# Patient Record
Sex: FEMALE | Race: WHITE | NOT HISPANIC OR LATINO | Employment: UNEMPLOYED | URBAN - METROPOLITAN AREA
[De-identification: names, ages, dates, MRNs, and addresses within clinical notes are randomized per-mention and may not be internally consistent; named-entity substitution may affect disease eponyms.]

---

## 2024-05-03 ENCOUNTER — APPOINTMENT (OUTPATIENT)
Dept: CARDIOLOGY | Facility: HOSPITAL | Age: 59
End: 2024-05-03
Payer: COMMERCIAL

## 2024-05-03 ENCOUNTER — APPOINTMENT (OUTPATIENT)
Dept: RADIOLOGY | Facility: HOSPITAL | Age: 59
End: 2024-05-03
Payer: COMMERCIAL

## 2024-05-03 ENCOUNTER — HOSPITAL ENCOUNTER (OUTPATIENT)
Facility: HOSPITAL | Age: 59
Setting detail: OBSERVATION
Discharge: HOME | End: 2024-05-05
Attending: EMERGENCY MEDICINE | Admitting: PHYSICIAN ASSISTANT
Payer: COMMERCIAL

## 2024-05-03 DIAGNOSIS — D72.829 LEUKOCYTOSIS, UNSPECIFIED TYPE: ICD-10-CM

## 2024-05-03 DIAGNOSIS — K52.9 ENTERITIS: ICD-10-CM

## 2024-05-03 DIAGNOSIS — R79.89 ELEVATED TROPONIN: ICD-10-CM

## 2024-05-03 DIAGNOSIS — N17.9 ACUTE KIDNEY INJURY (CMS-HCC): Primary | ICD-10-CM

## 2024-05-03 DIAGNOSIS — T50.901A ACCIDENTAL DRUG OVERDOSE, INITIAL ENCOUNTER: ICD-10-CM

## 2024-05-03 PROCEDURE — 99285 EMERGENCY DEPT VISIT HI MDM: CPT | Mod: 25

## 2024-05-03 PROCEDURE — 82550 ASSAY OF CK (CPK): CPT | Performed by: PHYSICIAN ASSISTANT

## 2024-05-03 PROCEDURE — 93005 ELECTROCARDIOGRAM TRACING: CPT

## 2024-05-03 PROCEDURE — 83735 ASSAY OF MAGNESIUM: CPT | Performed by: PHYSICIAN ASSISTANT

## 2024-05-03 PROCEDURE — 71045 X-RAY EXAM CHEST 1 VIEW: CPT | Performed by: SURGERY

## 2024-05-03 PROCEDURE — 84484 ASSAY OF TROPONIN QUANT: CPT | Performed by: PHYSICIAN ASSISTANT

## 2024-05-03 PROCEDURE — 36415 COLL VENOUS BLD VENIPUNCTURE: CPT | Performed by: PHYSICIAN ASSISTANT

## 2024-05-03 PROCEDURE — 85025 COMPLETE CBC W/AUTO DIFF WBC: CPT | Performed by: PHYSICIAN ASSISTANT

## 2024-05-03 PROCEDURE — 80053 COMPREHEN METABOLIC PANEL: CPT | Performed by: PHYSICIAN ASSISTANT

## 2024-05-03 PROCEDURE — 71045 X-RAY EXAM CHEST 1 VIEW: CPT

## 2024-05-03 ASSESSMENT — LIFESTYLE VARIABLES
EVER FELT BAD OR GUILTY ABOUT YOUR DRINKING: NO
HAVE PEOPLE ANNOYED YOU BY CRITICIZING YOUR DRINKING: NO
HAVE YOU EVER FELT YOU SHOULD CUT DOWN ON YOUR DRINKING: NO
EVER HAD A DRINK FIRST THING IN THE MORNING TO STEADY YOUR NERVES TO GET RID OF A HANGOVER: NO
TOTAL SCORE: 0

## 2024-05-03 ASSESSMENT — COLUMBIA-SUICIDE SEVERITY RATING SCALE - C-SSRS
6. HAVE YOU EVER DONE ANYTHING, STARTED TO DO ANYTHING, OR PREPARED TO DO ANYTHING TO END YOUR LIFE?: NO
2. HAVE YOU ACTUALLY HAD ANY THOUGHTS OF KILLING YOURSELF?: NO
1. IN THE PAST MONTH, HAVE YOU WISHED YOU WERE DEAD OR WISHED YOU COULD GO TO SLEEP AND NOT WAKE UP?: NO

## 2024-05-03 ASSESSMENT — PAIN - FUNCTIONAL ASSESSMENT: PAIN_FUNCTIONAL_ASSESSMENT: 0-10

## 2024-05-03 ASSESSMENT — PAIN SCALES - GENERAL: PAINLEVEL_OUTOF10: 0 - NO PAIN

## 2024-05-04 ENCOUNTER — APPOINTMENT (OUTPATIENT)
Dept: RADIOLOGY | Facility: HOSPITAL | Age: 59
End: 2024-05-04
Payer: COMMERCIAL

## 2024-05-04 PROBLEM — N17.9 AKI (ACUTE KIDNEY INJURY) (CMS-HCC): Status: ACTIVE | Noted: 2024-05-04

## 2024-05-04 LAB
ALBUMIN SERPL BCP-MCNC: 4.7 G/DL (ref 3.4–5)
ALP SERPL-CCNC: 67 U/L (ref 33–136)
ALT SERPL W P-5'-P-CCNC: 38 U/L (ref 7–45)
AMPHETAMINES UR QL SCN: ABNORMAL
ANION GAP SERPL CALC-SCNC: 13 MMOL/L (ref 10–20)
ANION GAP SERPL CALC-SCNC: 17 MMOL/L (ref 10–20)
APPEARANCE UR: ABNORMAL
AST SERPL W P-5'-P-CCNC: 59 U/L (ref 9–39)
BARBITURATES UR QL SCN: ABNORMAL
BASOPHILS # BLD AUTO: 0.06 X10*3/UL (ref 0–0.1)
BASOPHILS NFR BLD AUTO: 0.3 %
BENZODIAZ UR QL SCN: ABNORMAL
BILIRUB SERPL-MCNC: 0.5 MG/DL (ref 0–1.2)
BILIRUB UR STRIP.AUTO-MCNC: NEGATIVE MG/DL
BUN SERPL-MCNC: 14 MG/DL (ref 6–23)
BUN SERPL-MCNC: 14 MG/DL (ref 6–23)
BZE UR QL SCN: ABNORMAL
CALCIUM SERPL-MCNC: 8 MG/DL (ref 8.6–10.3)
CALCIUM SERPL-MCNC: 9.1 MG/DL (ref 8.6–10.3)
CANNABINOIDS UR QL SCN: ABNORMAL
CARDIAC TROPONIN I PNL SERPL HS: 56 NG/L (ref 0–13)
CARDIAC TROPONIN I PNL SERPL HS: 66 NG/L (ref 0–13)
CHLORIDE SERPL-SCNC: 105 MMOL/L (ref 98–107)
CHLORIDE SERPL-SCNC: 107 MMOL/L (ref 98–107)
CK SERPL-CCNC: 1281 U/L (ref 0–215)
CK SERPL-CCNC: 614 U/L (ref 0–215)
CO2 SERPL-SCNC: 20 MMOL/L (ref 21–32)
CO2 SERPL-SCNC: 21 MMOL/L (ref 21–32)
COLOR UR: YELLOW
CREAT SERPL-MCNC: 1.15 MG/DL (ref 0.5–1.05)
CREAT SERPL-MCNC: 1.67 MG/DL (ref 0.5–1.05)
EGFRCR SERPLBLD CKD-EPI 2021: 35 ML/MIN/1.73M*2
EGFRCR SERPLBLD CKD-EPI 2021: 55 ML/MIN/1.73M*2
EOSINOPHIL # BLD AUTO: 0.02 X10*3/UL (ref 0–0.7)
EOSINOPHIL NFR BLD AUTO: 0.1 %
ERYTHROCYTE [DISTWIDTH] IN BLOOD BY AUTOMATED COUNT: 15.1 % (ref 11.5–14.5)
ERYTHROCYTE [DISTWIDTH] IN BLOOD BY AUTOMATED COUNT: 15.3 % (ref 11.5–14.5)
FENTANYL+NORFENTANYL UR QL SCN: ABNORMAL
GLUCOSE BLD MANUAL STRIP-MCNC: 116 MG/DL (ref 74–99)
GLUCOSE BLD MANUAL STRIP-MCNC: 128 MG/DL (ref 74–99)
GLUCOSE BLD MANUAL STRIP-MCNC: 156 MG/DL (ref 74–99)
GLUCOSE BLD MANUAL STRIP-MCNC: 91 MG/DL (ref 74–99)
GLUCOSE SERPL-MCNC: 56 MG/DL (ref 74–99)
GLUCOSE SERPL-MCNC: 85 MG/DL (ref 74–99)
GLUCOSE UR STRIP.AUTO-MCNC: ABNORMAL MG/DL
HCT VFR BLD AUTO: 35.2 % (ref 36–46)
HCT VFR BLD AUTO: 44.2 % (ref 36–46)
HGB BLD-MCNC: 12.1 G/DL (ref 12–16)
HGB BLD-MCNC: 15.3 G/DL (ref 12–16)
HOLD SPECIMEN: NORMAL
HYALINE CASTS #/AREA URNS AUTO: ABNORMAL /LPF
IMM GRANULOCYTES # BLD AUTO: 0.26 X10*3/UL (ref 0–0.7)
IMM GRANULOCYTES NFR BLD AUTO: 1.1 % (ref 0–0.9)
KETONES UR STRIP.AUTO-MCNC: NEGATIVE MG/DL
LEUKOCYTE ESTERASE UR QL STRIP.AUTO: NEGATIVE
LYMPHOCYTES # BLD AUTO: 0.86 X10*3/UL (ref 1.2–4.8)
LYMPHOCYTES NFR BLD AUTO: 3.7 %
MAGNESIUM SERPL-MCNC: 2.19 MG/DL (ref 1.6–2.4)
MCH RBC QN AUTO: 31.7 PG (ref 26–34)
MCH RBC QN AUTO: 32 PG (ref 26–34)
MCHC RBC AUTO-ENTMCNC: 34.4 G/DL (ref 32–36)
MCHC RBC AUTO-ENTMCNC: 34.6 G/DL (ref 32–36)
MCV RBC AUTO: 92 FL (ref 80–100)
MCV RBC AUTO: 93 FL (ref 80–100)
METHADONE UR QL SCN: ABNORMAL
MONOCYTES # BLD AUTO: 1.76 X10*3/UL (ref 0.1–1)
MONOCYTES NFR BLD AUTO: 7.6 %
MUCOUS THREADS #/AREA URNS AUTO: ABNORMAL /LPF
NEUTROPHILS # BLD AUTO: 20.34 X10*3/UL (ref 1.2–7.7)
NEUTROPHILS NFR BLD AUTO: 87.2 %
NITRITE UR QL STRIP.AUTO: NEGATIVE
NRBC BLD-RTO: 0 /100 WBCS (ref 0–0)
NRBC BLD-RTO: 0 /100 WBCS (ref 0–0)
OPIATES UR QL SCN: ABNORMAL
OXYCODONE+OXYMORPHONE UR QL SCN: ABNORMAL
PCP UR QL SCN: ABNORMAL
PH UR STRIP.AUTO: 5 [PH]
PLATELET # BLD AUTO: 183 X10*3/UL (ref 150–450)
PLATELET # BLD AUTO: 283 X10*3/UL (ref 150–450)
POTASSIUM SERPL-SCNC: 3.7 MMOL/L (ref 3.5–5.3)
POTASSIUM SERPL-SCNC: 3.9 MMOL/L (ref 3.5–5.3)
PROT SERPL-MCNC: 7.4 G/DL (ref 6.4–8.2)
PROT UR STRIP.AUTO-MCNC: ABNORMAL MG/DL
RBC # BLD AUTO: 3.78 X10*6/UL (ref 4–5.2)
RBC # BLD AUTO: 4.82 X10*6/UL (ref 4–5.2)
RBC # UR STRIP.AUTO: ABNORMAL /UL
RBC #/AREA URNS AUTO: ABNORMAL /HPF
SODIUM SERPL-SCNC: 134 MMOL/L (ref 136–145)
SODIUM SERPL-SCNC: 141 MMOL/L (ref 136–145)
SP GR UR STRIP.AUTO: 1.05
SQUAMOUS #/AREA URNS AUTO: ABNORMAL /HPF
UROBILINOGEN UR STRIP.AUTO-MCNC: <2 MG/DL
WBC # BLD AUTO: 13.4 X10*3/UL (ref 4.4–11.3)
WBC # BLD AUTO: 23.3 X10*3/UL (ref 4.4–11.3)
WBC #/AREA URNS AUTO: ABNORMAL /HPF

## 2024-05-04 PROCEDURE — G0378 HOSPITAL OBSERVATION PER HR: HCPCS

## 2024-05-04 PROCEDURE — 85027 COMPLETE CBC AUTOMATED: CPT | Performed by: PHYSICIAN ASSISTANT

## 2024-05-04 PROCEDURE — 96372 THER/PROPH/DIAG INJ SC/IM: CPT | Performed by: PHYSICIAN ASSISTANT

## 2024-05-04 PROCEDURE — 2500000001 HC RX 250 WO HCPCS SELF ADMINISTERED DRUGS (ALT 637 FOR MEDICARE OP): Performed by: INTERNAL MEDICINE

## 2024-05-04 PROCEDURE — 96376 TX/PRO/DX INJ SAME DRUG ADON: CPT

## 2024-05-04 PROCEDURE — 74177 CT ABD & PELVIS W/CONTRAST: CPT

## 2024-05-04 PROCEDURE — 2500000004 HC RX 250 GENERAL PHARMACY W/ HCPCS (ALT 636 FOR OP/ED): Performed by: PHYSICIAN ASSISTANT

## 2024-05-04 PROCEDURE — 2500000005 HC RX 250 GENERAL PHARMACY W/O HCPCS: Performed by: EMERGENCY MEDICINE

## 2024-05-04 PROCEDURE — 80307 DRUG TEST PRSMV CHEM ANLYZR: CPT | Performed by: PHYSICIAN ASSISTANT

## 2024-05-04 PROCEDURE — 2550000001 HC RX 255 CONTRASTS: Performed by: EMERGENCY MEDICINE

## 2024-05-04 PROCEDURE — 74177 CT ABD & PELVIS W/CONTRAST: CPT | Performed by: RADIOLOGY

## 2024-05-04 PROCEDURE — 82550 ASSAY OF CK (CPK): CPT | Performed by: PHYSICIAN ASSISTANT

## 2024-05-04 PROCEDURE — 81001 URINALYSIS AUTO W/SCOPE: CPT | Mod: 59 | Performed by: PHYSICIAN ASSISTANT

## 2024-05-04 PROCEDURE — S4991 NICOTINE PATCH NONLEGEND: HCPCS | Performed by: PHYSICIAN ASSISTANT

## 2024-05-04 PROCEDURE — 97165 OT EVAL LOW COMPLEX 30 MIN: CPT | Mod: GO

## 2024-05-04 PROCEDURE — 84484 ASSAY OF TROPONIN QUANT: CPT | Performed by: EMERGENCY MEDICINE

## 2024-05-04 PROCEDURE — 2500000001 HC RX 250 WO HCPCS SELF ADMINISTERED DRUGS (ALT 637 FOR MEDICARE OP): Performed by: NURSE PRACTITIONER

## 2024-05-04 PROCEDURE — 76770 US EXAM ABDO BACK WALL COMP: CPT

## 2024-05-04 PROCEDURE — 36415 COLL VENOUS BLD VENIPUNCTURE: CPT | Performed by: PHYSICIAN ASSISTANT

## 2024-05-04 PROCEDURE — 82947 ASSAY GLUCOSE BLOOD QUANT: CPT | Mod: 59

## 2024-05-04 PROCEDURE — 96361 HYDRATE IV INFUSION ADD-ON: CPT

## 2024-05-04 PROCEDURE — 36415 COLL VENOUS BLD VENIPUNCTURE: CPT | Performed by: EMERGENCY MEDICINE

## 2024-05-04 PROCEDURE — 96365 THER/PROPH/DIAG IV INF INIT: CPT | Mod: 59

## 2024-05-04 PROCEDURE — 96375 TX/PRO/DX INJ NEW DRUG ADDON: CPT

## 2024-05-04 PROCEDURE — 83874 ASSAY OF MYOGLOBIN: CPT | Mod: PARLAB | Performed by: INTERNAL MEDICINE

## 2024-05-04 PROCEDURE — 80048 BASIC METABOLIC PNL TOTAL CA: CPT | Performed by: PHYSICIAN ASSISTANT

## 2024-05-04 PROCEDURE — 76770 US EXAM ABDO BACK WALL COMP: CPT | Performed by: RADIOLOGY

## 2024-05-04 PROCEDURE — 87086 URINE CULTURE/COLONY COUNT: CPT | Mod: PARLAB | Performed by: PHYSICIAN ASSISTANT

## 2024-05-04 PROCEDURE — 2500000002 HC RX 250 W HCPCS SELF ADMINISTERED DRUGS (ALT 637 FOR MEDICARE OP, ALT 636 FOR OP/ED): Performed by: PHYSICIAN ASSISTANT

## 2024-05-04 PROCEDURE — 97161 PT EVAL LOW COMPLEX 20 MIN: CPT | Mod: GP

## 2024-05-04 PROCEDURE — 2500000004 HC RX 250 GENERAL PHARMACY W/ HCPCS (ALT 636 FOR OP/ED): Performed by: EMERGENCY MEDICINE

## 2024-05-04 RX ORDER — HEPARIN SODIUM 5000 [USP'U]/ML
5000 INJECTION, SOLUTION INTRAVENOUS; SUBCUTANEOUS EVERY 8 HOURS SCHEDULED
Status: DISCONTINUED | OUTPATIENT
Start: 2024-05-04 | End: 2024-05-05 | Stop reason: HOSPADM

## 2024-05-04 RX ORDER — ACETAMINOPHEN 325 MG/1
650 TABLET ORAL EVERY 4 HOURS PRN
Status: DISCONTINUED | OUTPATIENT
Start: 2024-05-04 | End: 2024-05-05 | Stop reason: HOSPADM

## 2024-05-04 RX ORDER — BUPRENORPHINE HYDROCHLORIDE AND NALOXONE HYDROCHLORIDE DIHYDRATE 8; 2 MG/1; MG/1
1 TABLET SUBLINGUAL 2 TIMES DAILY
COMMUNITY

## 2024-05-04 RX ORDER — DEXTROSE 50 % IN WATER (D50W) INTRAVENOUS SYRINGE
25
Status: DISCONTINUED | OUTPATIENT
Start: 2024-05-04 | End: 2024-05-05 | Stop reason: HOSPADM

## 2024-05-04 RX ORDER — ONDANSETRON HYDROCHLORIDE 2 MG/ML
4 INJECTION, SOLUTION INTRAVENOUS ONCE
Status: COMPLETED | OUTPATIENT
Start: 2024-05-04 | End: 2024-05-04

## 2024-05-04 RX ORDER — DEXTROSE 50 % IN WATER (D50W) INTRAVENOUS SYRINGE
25 ONCE
Status: COMPLETED | OUTPATIENT
Start: 2024-05-04 | End: 2024-05-04

## 2024-05-04 RX ORDER — OMEPRAZOLE 40 MG/1
40 CAPSULE, DELAYED RELEASE ORAL
COMMUNITY

## 2024-05-04 RX ORDER — DOXEPIN HYDROCHLORIDE 25 MG/1
25 CAPSULE ORAL NIGHTLY PRN
COMMUNITY

## 2024-05-04 RX ORDER — DICYCLOMINE HYDROCHLORIDE 10 MG/1
10 CAPSULE ORAL 3 TIMES DAILY
Status: DISCONTINUED | OUTPATIENT
Start: 2024-05-04 | End: 2024-05-05 | Stop reason: HOSPADM

## 2024-05-04 RX ORDER — DEXTROSE 50 % IN WATER (D50W) INTRAVENOUS SYRINGE
12.5
Status: DISCONTINUED | OUTPATIENT
Start: 2024-05-04 | End: 2024-05-05 | Stop reason: HOSPADM

## 2024-05-04 RX ORDER — SODIUM CHLORIDE 9 MG/ML
100 INJECTION, SOLUTION INTRAVENOUS CONTINUOUS
Status: DISCONTINUED | OUTPATIENT
Start: 2024-05-04 | End: 2024-05-05

## 2024-05-04 RX ORDER — IBUPROFEN 200 MG
1 TABLET ORAL DAILY
Status: DISCONTINUED | OUTPATIENT
Start: 2024-05-04 | End: 2024-05-05 | Stop reason: HOSPADM

## 2024-05-04 RX ORDER — ONDANSETRON HYDROCHLORIDE 2 MG/ML
4 INJECTION, SOLUTION INTRAVENOUS EVERY 6 HOURS PRN
Status: DISCONTINUED | OUTPATIENT
Start: 2024-05-04 | End: 2024-05-05 | Stop reason: HOSPADM

## 2024-05-04 RX ORDER — BUPROPION HYDROCHLORIDE 300 MG/1
300 TABLET ORAL DAILY
COMMUNITY

## 2024-05-04 RX ORDER — AMLODIPINE BESYLATE 2.5 MG/1
2.5 TABLET ORAL DAILY
COMMUNITY

## 2024-05-04 RX ORDER — BUPROPION HYDROCHLORIDE 150 MG/1
300 TABLET ORAL DAILY
Status: DISCONTINUED | OUTPATIENT
Start: 2024-05-04 | End: 2024-05-05 | Stop reason: HOSPADM

## 2024-05-04 RX ORDER — ONDANSETRON 4 MG/1
4 TABLET, FILM COATED ORAL EVERY 6 HOURS PRN
Status: DISCONTINUED | OUTPATIENT
Start: 2024-05-04 | End: 2024-05-05 | Stop reason: HOSPADM

## 2024-05-04 RX ORDER — BUPRENORPHINE HYDROCHLORIDE 8 MG/1
8 TABLET SUBLINGUAL 2 TIMES DAILY
Status: DISCONTINUED | OUTPATIENT
Start: 2024-05-04 | End: 2024-05-05 | Stop reason: HOSPADM

## 2024-05-04 RX ORDER — HYDROXYZINE HYDROCHLORIDE 25 MG/1
25 TABLET, FILM COATED ORAL EVERY 4 HOURS PRN
COMMUNITY

## 2024-05-04 RX ORDER — KETOROLAC TROMETHAMINE 30 MG/ML
7.5 INJECTION, SOLUTION INTRAMUSCULAR; INTRAVENOUS ONCE
Status: COMPLETED | OUTPATIENT
Start: 2024-05-04 | End: 2024-05-04

## 2024-05-04 RX ORDER — PANTOPRAZOLE SODIUM 20 MG/1
20 TABLET, DELAYED RELEASE ORAL
Status: DISCONTINUED | OUTPATIENT
Start: 2024-05-05 | End: 2024-05-05 | Stop reason: HOSPADM

## 2024-05-04 RX ORDER — BUPRENORPHINE HYDROCHLORIDE AND NALOXONE HYDROCHLORIDE DIHYDRATE 8; 2 MG/1; MG/1
1 TABLET SUBLINGUAL 2 TIMES DAILY
Status: DISCONTINUED | OUTPATIENT
Start: 2024-05-04 | End: 2024-05-04 | Stop reason: RX

## 2024-05-04 RX ORDER — ACETAMINOPHEN 160 MG/5ML
650 SOLUTION ORAL EVERY 4 HOURS PRN
Status: DISCONTINUED | OUTPATIENT
Start: 2024-05-04 | End: 2024-05-05 | Stop reason: HOSPADM

## 2024-05-04 RX ORDER — CELECOXIB 200 MG/1
200 CAPSULE ORAL 2 TIMES DAILY
COMMUNITY

## 2024-05-04 RX ADMIN — DICYCLOMINE HYDROCHLORIDE 10 MG: 10 CAPSULE ORAL at 17:02

## 2024-05-04 RX ADMIN — PIPERACILLIN SODIUM AND TAZOBACTAM SODIUM 3.38 G: 3; .375 INJECTION, SOLUTION INTRAVENOUS at 20:38

## 2024-05-04 RX ADMIN — SODIUM CHLORIDE, POTASSIUM CHLORIDE, SODIUM LACTATE AND CALCIUM CHLORIDE 1000 ML: 600; 310; 30; 20 INJECTION, SOLUTION INTRAVENOUS at 00:45

## 2024-05-04 RX ADMIN — SODIUM CHLORIDE 100 ML/HR: 9 INJECTION, SOLUTION INTRAVENOUS at 14:12

## 2024-05-04 RX ADMIN — PIPERACILLIN SODIUM AND TAZOBACTAM SODIUM 3.38 G: 3; .375 INJECTION, SOLUTION INTRAVENOUS at 14:49

## 2024-05-04 RX ADMIN — BUPROPION HYDROCHLORIDE 300 MG: 150 TABLET, EXTENDED RELEASE ORAL at 12:29

## 2024-05-04 RX ADMIN — ACETAMINOPHEN 650 MG: 325 TABLET ORAL at 19:45

## 2024-05-04 RX ADMIN — SODIUM CHLORIDE 100 ML/HR: 9 INJECTION, SOLUTION INTRAVENOUS at 23:54

## 2024-05-04 RX ADMIN — HEPARIN SODIUM 5000 UNITS: 5000 INJECTION INTRAVENOUS; SUBCUTANEOUS at 21:02

## 2024-05-04 RX ADMIN — KETOROLAC TROMETHAMINE 7.5 MG: 30 INJECTION, SOLUTION INTRAMUSCULAR at 04:04

## 2024-05-04 RX ADMIN — SODIUM CHLORIDE 1000 ML: 9 INJECTION, SOLUTION INTRAVENOUS at 01:29

## 2024-05-04 RX ADMIN — NICOTINE 1 PATCH: 14 PATCH, EXTENDED RELEASE TRANSDERMAL at 09:41

## 2024-05-04 RX ADMIN — DEXTROSE MONOHYDRATE 25 G: 25 INJECTION, SOLUTION INTRAVENOUS at 00:44

## 2024-05-04 RX ADMIN — IOHEXOL 75 ML: 350 INJECTION, SOLUTION INTRAVENOUS at 02:00

## 2024-05-04 RX ADMIN — HEPARIN SODIUM 5000 UNITS: 5000 INJECTION INTRAVENOUS; SUBCUTANEOUS at 14:12

## 2024-05-04 RX ADMIN — SODIUM CHLORIDE 100 ML/HR: 9 INJECTION, SOLUTION INTRAVENOUS at 04:04

## 2024-05-04 RX ADMIN — PIPERACILLIN SODIUM AND TAZOBACTAM SODIUM 3.38 G: 3; .375 INJECTION, SOLUTION INTRAVENOUS at 04:04

## 2024-05-04 RX ADMIN — DICYCLOMINE HYDROCHLORIDE 10 MG: 10 CAPSULE ORAL at 20:38

## 2024-05-04 RX ADMIN — PIPERACILLIN SODIUM AND TAZOBACTAM SODIUM 3.38 G: 3; .375 INJECTION, SOLUTION INTRAVENOUS at 09:42

## 2024-05-04 RX ADMIN — HEPARIN SODIUM 5000 UNITS: 5000 INJECTION INTRAVENOUS; SUBCUTANEOUS at 06:12

## 2024-05-04 RX ADMIN — ONDANSETRON 4 MG: 2 INJECTION INTRAMUSCULAR; INTRAVENOUS at 00:44

## 2024-05-04 RX ADMIN — ACETAMINOPHEN 650 MG: 325 TABLET ORAL at 11:47

## 2024-05-04 SDOH — HEALTH STABILITY: PHYSICAL HEALTH: ON AVERAGE, HOW MANY DAYS PER WEEK DO YOU ENGAGE IN MODERATE TO STRENUOUS EXERCISE (LIKE A BRISK WALK)?: 0 DAYS

## 2024-05-04 SDOH — ECONOMIC STABILITY: TRANSPORTATION INSECURITY
IN THE PAST 12 MONTHS, HAS LACK OF TRANSPORTATION KEPT YOU FROM MEETINGS, WORK, OR FROM GETTING THINGS NEEDED FOR DAILY LIVING?: PATIENT DECLINED

## 2024-05-04 SDOH — SOCIAL STABILITY: SOCIAL INSECURITY: DO YOU FEEL ANYONE HAS EXPLOITED OR TAKEN ADVANTAGE OF YOU FINANCIALLY OR OF YOUR PERSONAL PROPERTY?: NO

## 2024-05-04 SDOH — SOCIAL STABILITY: SOCIAL INSECURITY: DO YOU FEEL UNSAFE GOING BACK TO THE PLACE WHERE YOU ARE LIVING?: NO

## 2024-05-04 SDOH — SOCIAL STABILITY: SOCIAL INSECURITY: WERE YOU ABLE TO COMPLETE ALL THE BEHAVIORAL HEALTH SCREENINGS?: YES

## 2024-05-04 SDOH — ECONOMIC STABILITY: INCOME INSECURITY: IN THE LAST 12 MONTHS, WAS THERE A TIME WHEN YOU WERE NOT ABLE TO PAY THE MORTGAGE OR RENT ON TIME?: PATIENT DECLINED

## 2024-05-04 SDOH — ECONOMIC STABILITY: HOUSING INSECURITY
IN THE LAST 12 MONTHS, WAS THERE A TIME WHEN YOU DID NOT HAVE A STEADY PLACE TO SLEEP OR SLEPT IN A SHELTER (INCLUDING NOW)?: PATIENT DECLINED

## 2024-05-04 SDOH — SOCIAL STABILITY: SOCIAL INSECURITY: ARE YOU OR HAVE YOU BEEN THREATENED OR ABUSED PHYSICALLY, EMOTIONALLY, OR SEXUALLY BY ANYONE?: NO

## 2024-05-04 SDOH — ECONOMIC STABILITY: INCOME INSECURITY: HOW HARD IS IT FOR YOU TO PAY FOR THE VERY BASICS LIKE FOOD, HOUSING, MEDICAL CARE, AND HEATING?: PATIENT DECLINED

## 2024-05-04 SDOH — SOCIAL STABILITY: SOCIAL INSECURITY: ABUSE: ADULT

## 2024-05-04 SDOH — SOCIAL STABILITY: SOCIAL INSECURITY: DOES ANYONE TRY TO KEEP YOU FROM HAVING/CONTACTING OTHER FRIENDS OR DOING THINGS OUTSIDE YOUR HOME?: NO

## 2024-05-04 SDOH — ECONOMIC STABILITY: TRANSPORTATION INSECURITY
IN THE PAST 12 MONTHS, HAS THE LACK OF TRANSPORTATION KEPT YOU FROM MEDICAL APPOINTMENTS OR FROM GETTING MEDICATIONS?: PATIENT DECLINED

## 2024-05-04 SDOH — SOCIAL STABILITY: SOCIAL INSECURITY: HAVE YOU HAD ANY THOUGHTS OF HARMING ANYONE ELSE?: NO

## 2024-05-04 SDOH — SOCIAL STABILITY: SOCIAL INSECURITY: HAVE YOU HAD THOUGHTS OF HARMING ANYONE ELSE?: NO

## 2024-05-04 SDOH — HEALTH STABILITY: PHYSICAL HEALTH: ON AVERAGE, HOW MANY MINUTES DO YOU ENGAGE IN EXERCISE AT THIS LEVEL?: 0 MIN

## 2024-05-04 SDOH — SOCIAL STABILITY: SOCIAL INSECURITY: ARE THERE ANY APPARENT SIGNS OF INJURIES/BEHAVIORS THAT COULD BE RELATED TO ABUSE/NEGLECT?: NO

## 2024-05-04 SDOH — SOCIAL STABILITY: SOCIAL INSECURITY: HAS ANYONE EVER THREATENED TO HURT YOUR FAMILY OR YOUR PETS?: NO

## 2024-05-04 ASSESSMENT — ACTIVITIES OF DAILY LIVING (ADL)
BATHING: INDEPENDENT
ADEQUATE_TO_COMPLETE_ADL: YES
GROOMING: INDEPENDENT
HEARING - LEFT EAR: FUNCTIONAL
WALKS IN HOME: INDEPENDENT
TOILETING: INDEPENDENT
JUDGMENT_ADEQUATE_SAFELY_COMPLETE_DAILY_ACTIVITIES: YES
HEARING - RIGHT EAR: FUNCTIONAL
LACK_OF_TRANSPORTATION: NO
PATIENT'S MEMORY ADEQUATE TO SAFELY COMPLETE DAILY ACTIVITIES?: YES
FEEDING YOURSELF: INDEPENDENT
DRESSING YOURSELF: INDEPENDENT

## 2024-05-04 ASSESSMENT — LIFESTYLE VARIABLES
HOW OFTEN DO YOU HAVE 6 OR MORE DRINKS ON ONE OCCASION: LESS THAN MONTHLY
HOW OFTEN DO YOU HAVE A DRINK CONTAINING ALCOHOL: MONTHLY OR LESS
AUDIT-C TOTAL SCORE: 2
HOW MANY STANDARD DRINKS CONTAINING ALCOHOL DO YOU HAVE ON A TYPICAL DAY: 1 OR 2
AUDIT-C TOTAL SCORE: 2
SKIP TO QUESTIONS 9-10: 0

## 2024-05-04 ASSESSMENT — COGNITIVE AND FUNCTIONAL STATUS - GENERAL
WALKING IN HOSPITAL ROOM: A LITTLE
DRESSING REGULAR LOWER BODY CLOTHING: A LITTLE
MOBILITY SCORE: 21
MOVING TO AND FROM BED TO CHAIR: A LITTLE
TOILETING: A LITTLE
CLIMB 3 TO 5 STEPS WITH RAILING: A LITTLE
DAILY ACTIVITIY SCORE: 24
DAILY ACTIVITIY SCORE: 20
MOBILITY SCORE: 24
DRESSING REGULAR UPPER BODY CLOTHING: A LITTLE
PATIENT BASELINE BEDBOUND: NO
HELP NEEDED FOR BATHING: A LITTLE

## 2024-05-04 ASSESSMENT — PAIN - FUNCTIONAL ASSESSMENT
PAIN_FUNCTIONAL_ASSESSMENT: 0-10
PAIN_FUNCTIONAL_ASSESSMENT: 0-10

## 2024-05-04 ASSESSMENT — PATIENT HEALTH QUESTIONNAIRE - PHQ9
1. LITTLE INTEREST OR PLEASURE IN DOING THINGS: SEVERAL DAYS
2. FEELING DOWN, DEPRESSED OR HOPELESS: SEVERAL DAYS
SUM OF ALL RESPONSES TO PHQ9 QUESTIONS 1 & 2: 2

## 2024-05-04 ASSESSMENT — PAIN DESCRIPTION - LOCATION: LOCATION: HEAD

## 2024-05-04 ASSESSMENT — PAIN SCALES - GENERAL
PAINLEVEL_OUTOF10: 3
PAINLEVEL_OUTOF10: 3

## 2024-05-04 NOTE — ED TRIAGE NOTES
"Patient arrives via EMS from home after possible overdose. Per EMS, upon arrival to scene patient has agonal respirations with pinpoint pupils and unresponsive. Patient was given 1 mg Narcan IV and was AxOx4 upon arrival to ED. Patient denies any drug use just says she took Narcan because she \"didn't feel good\". Patient is only complaining of mild nausea.   "

## 2024-05-04 NOTE — PROGRESS NOTES
Physical Therapy    Physical Therapy Evaluation    Patient Name: Trina Santos  MRN: [id number]  Today's Date: 5/4/2024   Time Calculation  Start Time: 1106  Stop Time: 1123  Time Calculation (min): 17 min    Assessment/Plan   PT Assessment  PT Assessment Results: Decreased strength, Decreased endurance, Impaired balance, Decreased mobility, Pain  Rehab Prognosis: Good  Evaluation/Treatment Tolerance: Patient tolerated treatment well  Medical Staff Made Aware: Yes  End of Session Communication: Bedside nurse  Assessment Comment: Pt presents /c above impairments and decline from functional baseline. Pt will benefit from continued PT services at low intensity to address above and maximize functional mobility.  End of Session Patient Position: Bed, 2 rail up, Alarm off, not on at start of session  IP OR SWING BED PT PLAN  Inpatient or Swing Bed: Inpatient  PT Plan  Treatment/Interventions: Bed mobility, Transfer training, Gait training, Balance training, Neuromuscular re-education, Strengthening, Endurance training, Therapeutic exercise, Therapeutic activity, Stair training  PT Plan: Skilled PT  PT Frequency: 3 times per week  PT Discharge Recommendations: Low intensity level of continued care  PT - OK to Discharge: Yes    Subjective     Current Problem:  Patient Active Problem List   Diagnosis    SHAKIRA (acute kidney injury) ([facility])       General Visit Information:  General  Reason for Referral: PT eval and treat  Referred By: Patsy  Past Medical History Relevant to Rehab: Bipolar, TIFFANY, opiate use disorder  Prior to Session Communication: Bedside nurse  Patient Position Received: Bed, 2 rail up, Alarm off, not on at start of session  General Comment: Room number:605   BIB EMS possible drug overdose. Agonal respirations upon EMS arrival, given Narcan. Also admits to nausea, diarrhea the past week and R sided abdominal pain. Isolation in place for cdiff r/o. Pt supine, agreeable.    Home Living:  Home Living  Home Living  Comments: Ranch style home, 3STE. Pt lives /c her mother who has dementia.    Prior Level of Function:  Prior Function Per Pt/Caregiver Report  Prior Function Comments: Pt indep at baseline. +drives. Denies falls or AD use. Has siblings for support.    Precautions:  Precautions  Precautions Comment: contact plus r/o Cdiff    Objective     Pain:  Pain Assessment  Pain Assessment: 0-10  Pain Score:  (pt c/o HA, R side discomfort and chronic R hip pain; does not rate)    Cognition:  Cognition  Overall Cognitive Status: Within Functional Limits (min safety cues)    General Assessments:  General Observation  General Observation: activity orders: ambulate /c A   skin integrity:WFL   Activity Tolerance  Endurance: Decreased tolerance for upright activites  Sensation  Sensation Comment: denies n/t  Strength  Strength Comments: LLE 4/5, RLE 4-/5, pain limited     Coordination  Movements are Fluid and Coordinated: Yes     Dynamic Sitting Balance  Dynamic Sitting-Comments: G  Dynamic Standing Balance  Dynamic Standing-Comments: G-    Functional Assessments:     Bed Mobility  Bed Mobility: Yes  Bed Mobility 1  Bed Mobility Comments 1: Supine<>Sit /c Supervision  Transfers  Transfer: Yes  Transfer 1  Trials/Comments 1: Sit<>Stand /c supervision  Ambulation/Gait Training  Ambulation/Gait Training Performed:  (Pt ambulates 20'x2 /c CGA. Step through gait, slightly antalgic pattern, no overt instability. Distance limited 2nd dizziness.)          Outcome Measures:  [facility] Basic Mobility  Turning from your back to your side while in a flat bed without using bedrails: None  Moving from lying on your back to sitting on the side of a flat bed without using bedrails: None  Moving to and from bed to chair (including a wheelchair): A little  Standing up from a chair using your arms (e.g. wheelchair or bedside chair): None  To walk in hospital room: A little  Climbing 3-5 steps with railing: A little  Basic Mobility - Total Score: 21        Goals:  Encounter Problems       Encounter Problems (Active)       PT Problem       STG - Pt will amb 200' using LRAD with mod I (Progressing)       Start:  05/04/24    Expected End:  05/18/24            STG -  Pt will navigate 4 stairs using rail with mod I (Progressing)       Start:  05/04/24    Expected End:  05/18/24            STG - Pt will maintain G dynamic standing balance to decrease risk of falls (Progressing)       Start:  05/04/24    Expected End:  05/18/24            STG - Pt will perform 2-3 sets of BLE therex x10 to maximize functional strength and independence  (Progressing)       Start:  05/04/24    Expected End:  05/18/24                 Education Documentation  Mobility Training, taught by Trina Yanez, PT at 5/4/2024  2:34 PM.  Learner: Patient  Readiness: Acceptance  Method: Explanation  Response: Verbalizes Understanding, Needs Reinforcement    Education Comments  No comments found.      '

## 2024-05-04 NOTE — H&P
History Of Present Illness  Trina Santos is a 58 y.o. female with past medical history significant for opiate use disorder, nicotine use disorder, bipolar disorder, and TIFFANY who presents via EMS for suspected drug overdose.  Police/EMS were on scene.  Police reported there was a white powder substance near the patient.  EMS reported patient suffered an accidental drug overdose where she thereafter took Narcan because she was not feeling well.  Patient does tell me she has been sober and in rehab for the past 82 days.  However does state last night she found a yellow/white powdery substance in her room that she then tried.  Says her mother came by and found her face down on the floor which is when EMS was called.  Patient does not know what the substance was.  Denies any suicidal ideation.  Does admit to daily marijuana and cigarette use.  Denies alcohol use.  States she has been abusing opiates since she was 28 years old due to chronic pains.  Patient also admits to nausea and diarrhea for the past week.  Reports multiple episodes of diarrhea daily.  Patient also admits to significant right-sided abdominal pain for the past couple days.  Denies any recent antibiotic use.  Also admits to generalized weakness and fatigue.  Denies headaches, dizziness, shortness of breath, chest pains, emesis, urinary changes, or fever/chills.  Of note, patient did have a positive drug screen test for fentanyl on 3/5/2024 at [facility].    ED course: On arrival to the ED, patient was afebrile, and hemodynamically stable with SpO2 100% on room air.  Glucose 56.  Creatinine 1.67.  AST 59.  Creatinine kinase 614.  Initial troponin was 56, repeat was 66.  WBC 23.3 left left shift.  Chest x-ray is unremarkable.  CT abdomen/pelvis suggesting enteritis.  Patient given 25 g dextrose injection, LR bolus, NS bolus, Zofran, and Zosyn in the ED.    EKG: Normal sinus rhythm, rate 71 bpm, DC interval 166, QTc 434.  No acute ischemia.    Admitting  provider is Dr. Chacon     Past Medical History  No past medical history on file.    Surgical History  No past surgical history on file.     Social History  History of opiate abuse, has been using since 28 years old.  States she has been clean for 82 days, however relapsed and snorted an unknown drug last night.  Admits to smoking half a pack a day of cigarettes.  Admits to daily marijuana use, states he uses Gummies.  History of alcohol abuse, however denies having drank alcohol for a long time.    Family History  No family history on file.     Allergies  Patient has no known allergies.    Review of Systems  10 point review of system negative except as noted above in HPI    Physical Exam  Constitutional:       Appearance: Normal appearance.   HENT:      Head: Normocephalic and atraumatic.      Mouth/Throat:      Mouth: Mucous membranes are moist.      Pharynx: Oropharynx is clear.   Eyes:      Extraocular Movements: Extraocular movements intact.      Conjunctiva/sclera: Conjunctivae normal.      Pupils: Pupils are equal, round, and reactive to light.   Cardiovascular:      Rate and Rhythm: Normal rate and regular rhythm.      Pulses: Normal pulses.      Heart sounds: Normal heart sounds.   Pulmonary:      Effort: Pulmonary effort is normal.      Breath sounds: Normal breath sounds. No wheezing.   Abdominal:      General: Bowel sounds are normal.      Palpations: Abdomen is soft.      Tenderness: There is abdominal tenderness. There is right CVA tenderness and guarding. There is no left CVA tenderness.      Comments: Tenderness to palpation of right flank and right side of abdomen.   Musculoskeletal:         General: Normal range of motion.      Right lower leg: No edema.      Left lower leg: No edema.   Skin:     General: Skin is warm and dry.   Neurological:      General: No focal deficit present.      Mental Status: She is alert and oriented to person, place, and time.   Psychiatric:         Mood and Affect: Mood  "normal.         Behavior: Behavior normal.       Last Recorded Vitals  Blood pressure 102/63, pulse 71, temperature 36.2 °C (97.2 °F), temperature source Temporal, resp. rate 14, height 1.702 m (5' 7\"), weight 72.6 kg (160 lb), SpO2 98%.    Relevant Results  Results for orders placed or performed during the hospital encounter of 05/03/24 (from the past 24 hour(s))   CBC and Auto Differential   Result Value Ref Range    WBC 23.3 (H) 4.4 - 11.3 x10*3/uL    nRBC 0.0 0.0 - 0.0 /100 WBCs    RBC 4.82 4.00 - 5.20 x10*6/uL    Hemoglobin 15.3 12.0 - 16.0 g/dL    Hematocrit 44.2 36.0 - 46.0 %    MCV 92 80 - 100 fL    MCH 31.7 26.0 - 34.0 pg    MCHC 34.6 32.0 - 36.0 g/dL    RDW 15.1 (H) 11.5 - 14.5 %    Platelets 283 150 - 450 x10*3/uL    Neutrophils % 87.2 40.0 - 80.0 %    Immature Granulocytes %, Automated 1.1 (H) 0.0 - 0.9 %    Lymphocytes % 3.7 13.0 - 44.0 %    Monocytes % 7.6 2.0 - 10.0 %    Eosinophils % 0.1 0.0 - 6.0 %    Basophils % 0.3 0.0 - 2.0 %    Neutrophils Absolute 20.34 (H) 1.20 - 7.70 x10*3/uL    Immature Granulocytes Absolute, Automated 0.26 0.00 - 0.70 x10*3/uL    Lymphocytes Absolute 0.86 (L) 1.20 - 4.80 x10*3/uL    Monocytes Absolute 1.76 (H) 0.10 - 1.00 x10*3/uL    Eosinophils Absolute 0.02 0.00 - 0.70 x10*3/uL    Basophils Absolute 0.06 0.00 - 0.10 x10*3/uL   Comprehensive metabolic panel   Result Value Ref Range    Glucose 56 (L) 74 - 99 mg/dL    Sodium 141 136 - 145 mmol/L    Potassium 3.7 3.5 - 5.3 mmol/L    Chloride 107 98 - 107 mmol/L    Bicarbonate 21 21 - 32 mmol/L    Anion Gap 17 10 - 20 mmol/L    Urea Nitrogen 14 6 - 23 mg/dL    Creatinine 1.67 (H) 0.50 - 1.05 mg/dL    eGFR 35 (L) >60 mL/min/1.73m*2    Calcium 9.1 8.6 - 10.3 mg/dL    Albumin 4.7 3.4 - 5.0 g/dL    Alkaline Phosphatase 67 33 - 136 U/L    Total Protein 7.4 6.4 - 8.2 g/dL    AST 59 (H) 9 - 39 U/L    Bilirubin, Total 0.5 0.0 - 1.2 mg/dL    ALT 38 7 - 45 U/L   Cardiac Enzymes - CPK   Result Value Ref Range    Creatine Kinase 614 (H) 0 " - 215 U/L   Troponin I, High Sensitivity   Result Value Ref Range    Troponin I, High Sensitivity 56 (HH) 0 - 13 ng/L   Magnesium   Result Value Ref Range    Magnesium 2.19 1.60 - 2.40 mg/dL   Troponin I, High Sensitivity   Result Value Ref Range    Troponin I, High Sensitivity 66 (HH) 0 - 13 ng/L   POCT GLUCOSE   Result Value Ref Range    POCT Glucose 156 (H) 74 - 99 mg/dL      CT abdomen pelvis w IV contrast    Result Date: 5/4/2024  Interpreted By:  Finkelstein, Evan, STUDY: CT ABDOMEN PELVIS W IV CONTRAST;  5/4/2024 2:00 am   INDICATION: Signs/Symptoms:generalized pain.   COMPARISON: None.   ACCESSION NUMBER(S): [id number]   ORDERING CLINICIAN: PAWAN DING   TECHNIQUE: Axial CT images of the abdomen and pelvis with coronal and sagittal reconstructed images obtained after intravenous administration of 75 mL Omnipaque 350   FINDINGS: LOWER CHEST: Dependent bibasilar atelectasis.   ABDOMEN:   LIVER: Normal attenuation and contour. BILE DUCTS: Normal caliber. GALLBLADDER: No calcified gallstones. No wall thickening. PORTAL VEIN: Patent SPLEEN: Unremarkable. PANCREAS: Unremarkable. ADRENALS: Unremarkable. KIDNEYS, URETERS and URINARY BLADDER: Symmetric renal enhancement. No hydronephrosis or perinephric fluid collection.  Bladder is within normal limits REPRODUCTIVE ORGANS: No pelvic masses.   ABDOMINAL WALL: Fat containing umbilical hernia. Fat containing left inguinal hernia. PERITONEUM: No ascites, free air or fluid collection.   BOWEL: No dilated bowel. Wall thickening of small bowel in the lower pelvis with mild adjacent stranding. Normal appendix.   VESSELS: Moderate aortoiliac calcifications. RETROPERITONEUM: No pathologically enlarged retroperitoneal lymph nodes.   BONES: No acute osseous abnormality.       Wall thickening of small bowel in the lower pelvis with mild adjacent stranding suggestive of enteritis.   Dependent bibasilar atelectasis.     MACRO: None.   Signed by: Evan Finkelstein 5/4/2024 2:16  AM Dictation workstation:   [id number]    XR chest 1 view    Result Date: 5/3/2024  Interpreted By:  Raúl Hensley, STUDY: XR CHEST 1 VIEW;  5/3/2024 11:51 pm   INDICATION: Signs/Symptoms:ill.   COMPARISON: Chest radiography of 09/30/2020.   ACCESSION NUMBER(S): [id number]   ORDERING CLINICIAN: PAWAN DING   FINDINGS: AP radiograph of the chest was provided.   Limited by portable technique and patient soft tissue attenuation factors. Leads overlie the chest, partially obscuring the field-of-view.   CARDIOMEDIASTINAL SILHOUETTE: Cardiomediastinal silhouette is normal in size and configuration.   LUNGS: No focal consolidation, pleural effusion, or pneumothorax.   ABDOMEN: No remarkable upper abdominal findings.   BONES: No acute osseous changes.       1.  No evidence of acute cardiopulmonary process.       MACRO: None   Signed by: Raúl Hensley 5/3/2024 11:59 PM Dictation workstation:   [id number]     Assessment/Plan   Active Problems:  There are no active Hospital Problems.    Trina Santos is a 58 y.o. female with opiate use disorder presents via EMS for suspected drug overdose. EMS reported patient suffered an accidental drug overdose where she thereafter took Narcan because she was not feeling well.  Patient does tell me she has been sober and in rehab for the past 82 days.  However does state last night she found a yellow/white powdery substance in her room that she then tried. Patient does not know what the substance was.  Denies any suicidal ideation.  Patient would also like pain management consult either inpatient or after discharge.    CODE STATUS: Full code    #Acute kidney injury (creatinine 1.67 today, creatinine 0.98 on 4/30/2024)  #Accidental drug overdose, patient gave herself Narcan at home  #Elevated CK, 614  #Elevated troponin  #Weakness  Admit as inpatient with telemetry monitoring  Suspected drug-induced SHAKIRA/rhabdo  IV fluids  Repeat CK in the a.m., repeat troponin ordered, trend  Urinalysis,  drug screen ordered  Q 4 vitals  CBC, BMP in the a.m.  Swallow assessment, then okay for regular diet-see how patient tolerates  Tylenol, Zofran as needed  PT/OT for evaluation and treatment  Consider nephrology consult if SHAKIRA/CK level does not improve after fluids    #Enteritis  #Abdominal pain  #Diarrhea  #Leukocytosis with left shift  IV Zosyn every 6 hours  Monitor with a.m. labs  C. difficile PCR  Stool pathogen PCR    #Hypoglycemia  25 g IV dextrose given in the ED  Hypoglycemia protocol ordered    #Nicotine use disorder  Nicotine patch ordered    Chronic conditions:  Opiate/nicotine use disorder, bipolar disorder, TIFFANY    #DVT prophylaxis  Ambulation  Heparin       I spent 60 minutes in the professional and overall care of this patient.  Patient fully evaluated and plan as above  Jose Garner PA-C

## 2024-05-04 NOTE — CARE PLAN
Problem: Pain  Goal: My pain/discomfort is manageable  Outcome: Progressing     Problem: Safety  Goal: Patient will be injury free during hospitalization  Outcome: Progressing  Goal: I will remain free of falls  Outcome: Progressing     Problem: Daily Care  Goal: Daily care needs are met  Outcome: Progressing     Problem: Psychosocial Needs  Goal: Demonstrates ability to cope with hospitalization/illness  Outcome: Progressing  Goal: Collaborate with me, my family, and caregiver to identify my specific goals  Outcome: Progressing  Flowsheets (Taken 5/4/2024 1380)  Cultural Requests During Hospitalization: n/a  Spiritual Requests During Hospitalization: n/a     Problem: Discharge Barriers  Goal: My discharge needs are met  Outcome: Progressing     Problem: Pain  Goal: Takes deep breaths with improved pain control throughout the shift  Outcome: Progressing  Goal: Turns in bed with improved pain control throughout the shift  Outcome: Progressing  Goal: Walks with improved pain control throughout the shift  Outcome: Progressing  Goal: Performs ADL's with improved pain control throughout shift  Outcome: Progressing  Goal: Participates in PT with improved pain control throughout the shift  Outcome: Progressing  Goal: Free from opioid side effects throughout the shift  Outcome: Progressing  Goal: Free from acute confusion related to pain meds throughout the shift  Outcome: Progressing

## 2024-05-04 NOTE — PROGRESS NOTES
05/04/24 1144   Discharge Planning   Living Arrangements Parent  (cares for mother with early dementia)   Support Systems Family members;Friends/neighbors;Parent   Assistance Needed Independent and driving PTA. Cares for mother at home with early dementia   Type of Residence Private residence   Number of Stairs to Enter Residence 3   Number of Stairs Within Residence 10   Do you have animals or pets at home? Yes   Type of Animals or Pets 1 cat   Home or Post Acute Services In home services   Type of Home Care Services Home OT;Home PT   Patient expects to be discharged to: home   Does the patient need discharge transport arranged? No   Financial Resource Strain   How hard is it for you to pay for the very basics like food, housing, medical care, and heating? Not very   Housing Stability   In the last 12 months, was there a time when you were not able to pay the mortgage or rent on time? N   In the last 12 months, how many places have you lived? 1   In the last 12 months, was there a time when you did not have a steady place to sleep or slept in a shelter (including now)? N   Transportation Needs   In the past 12 months, has lack of transportation kept you from medical appointments or from getting medications? no   In the past 12 months, has lack of transportation kept you from meetings, work, or from getting things needed for daily living? No     This TCC met with patient at bedside, introduced self and explained role.  Demographic information and insurance verified.  Patient is from home with mother who has early dementia.  Patient provides care for her mother along with patient's sisters.  Patient is independent and driving.  Patient declined needing support for substance abuse, and plans to [facility] with [facility] at discharge.  Denies SW needs at this time.  Patient plans to return home at discharge, no needs.  PT/OT evals pending and discussed with patient.  Patient's sister is available to provide  transportation home.  Care Transitions will continue to follow.    11:40 am Addendum  This TCC met with patient at bedside to discuss PT/OT evals recommendation for HHC.  [place] of choice explained.  Patient declined list, preference is [facility].  Patient's nurse updated.  Care Transitions will continue to follow.

## 2024-05-04 NOTE — ED PROVIDER NOTES
HPI   Chief Complaint   Patient presents with    Drug Overdose       58-year-old female with a significant past medical history of opiate use disorder along with bipolar disorder and TIFFANY presents via EMS for suspected drug overdose.  Police/EMS were on scene.  Police reported that there was a white powder substance near the patient.  Patient reportedly called 911 after using Narcan.  Upon further questioning the patient states that she took Narcan because she was not feeling well.  She states that she did not use any illicit substances this evening.  I asked the patient specifically what the white powder substance was however she would not answer.  She denies any chest pain, shortness of breath or difficulty breathing.  She states that she just does not feel well.  I asked the patient specifically if this was intentional however she denies any suicidal ideation                          Cruz Coma Scale Score: 15                     Patient History   No past medical history on file.  No past surgical history on file.  No family history on file.  Social History     Tobacco Use    Smoking status: Not on file    Smokeless tobacco: Not on file   Substance Use Topics    Alcohol use: Not on file    Drug use: Not on file       Physical Exam   ED Triage Vitals [05/03/24 2335]   Temperature Heart Rate Respirations BP   36.2 °C (97.2 °F) 70 18 129/68      Pulse Ox Temp Source Heart Rate Source Patient Position   100 % Temporal Monitor --      BP Location FiO2 (%)     -- --       Physical Exam  Vitals and nursing note reviewed.   Constitutional:       General: She is not in acute distress.     Appearance: Normal appearance. She is normal weight. She is not ill-appearing, toxic-appearing or diaphoretic.   HENT:      Head: Normocephalic.      Nose: Nose normal.      Mouth/Throat:      Mouth: Mucous membranes are moist.   Eyes:      Extraocular Movements: Extraocular movements intact.      Conjunctiva/sclera: Conjunctivae  normal.   Cardiovascular:      Rate and Rhythm: Normal rate and regular rhythm.      Pulses: Normal pulses.   Pulmonary:      Effort: Pulmonary effort is normal. No respiratory distress.      Breath sounds: Normal breath sounds.   Abdominal:      General: Abdomen is flat. There is no distension.      Palpations: Abdomen is soft.      Tenderness: There is no abdominal tenderness. There is no guarding or rebound.   Musculoskeletal:         General: Normal range of motion.      Cervical back: Normal range of motion and neck supple.   Skin:     General: Skin is warm and dry.      Capillary Refill: Capillary refill takes less than 2 seconds.   Neurological:      General: No focal deficit present.      Mental Status: She is alert and oriented to person, place, and time.   Psychiatric:         Mood and Affect: Mood normal.         Behavior: Behavior normal.         Thought Content: Thought content normal.         Judgment: Judgment normal.         ED Course & MDM   ED Course as of 05/04/24 0230   Sat May 04, 2024   0005 IMPRESSION:  1.  No evidence of acute cardiopulmonary process.   [DS]   0131 LEUKOCYTES (10*3/UL) IN BLOOD BY AUTOMATED COUNT, Lebanese(!): 23.3 [DS]   0131 HEMOGLOBIN: 15.3 [DS]   0131 Creatine Kinase(!): 614 [DS]   0131 MAGNESIUM: 2.19 [DS]   0131 POCT Glucose(!): 156 [DS]   0131 Troponin I, High Sensitivity(!!): 56 [DS]   0131 GLUCOSE(!): 56 [DS]   0131 Creatinine(!): 1.67 [DS]   0202 LEUKOCYTES (10*3/UL) IN BLOOD BY AUTOMATED COUNT, Lebanese(!): 23.3 [DS]   0222 Patient is a 58-year-old female who presents to the emergency department due to concern for accidental drug overdose.  History is hard to gather initially.  Patient states she gave herself Narcan because she used a substance that she had crushed up and snorted.  Physical exam: Well-appearing female.  Mucous membranes mildly dry.  Abdomen tender without rebound or guarding.  MDM: EKG obtained interpreted by me.  Metabolic workup pursued.  Patient  is having rather significant leukocytosis and evidence of acute kidney injury.  She states she has been having diarrhea for the past 3 days.  She is aggressively hydrated and labs were obtained.  CT does demonstrate mild enteritis.  She is covered with antibiotics.  Given significant leukocytosis, she will be admitted. [MK]      ED Course User Index  [DS] Baldemar Ferrara PA-C  [MK] Robe Cortes MD         Diagnoses as of 05/04/24 0230   Acute kidney injury (CMS-HCC)   Accidental drug overdose, initial encounter   Elevated troponin   Leukocytosis, unspecified type   Enteritis       Medical Decision Making  Patient would not provide any helpful information on what transpired this evening.  EMS reported that the patient reportedly suffered an unintentional drug overdose and then proceeded to give herself Narcan.  I reviewed the patient's electronic medical records which did reveal significant opiate use.  On arrival to this facility the patient was found to be alert.  Extensive blood work along with imaging was obtained.  The patient was found to have a glucose of 56.  This was treated with D50.  Patient was found to have an acute kidney injury with a creatinine of 1.67.  Leukocytosis of 23.3 which at this point appears to be nonspecific.  Initial and repeat high-sensitivity troponin levels were 56 and 66 respectively.  Hemoglobin within normal limits.  .  Chest x-ray nonspecific.         Procedure  Procedures     Baldemar Ferrara PA-C  05/04/24 0223       Robe Cortes MD  05/04/24 0230

## 2024-05-04 NOTE — PROGRESS NOTES
Occupational Therapy    Evaluation    Patient Name: Trina Santos  MRN: [id number]  Today's Date: 5/4/2024  Time Calculation  Start Time: 1107  Stop Time: 1123  Time Calculation (min): 16 min    Assessment  IP OT Assessment  OT Assessment: Patient presents with a decline in functional status and would benefit from O.T. services at a low intensity to improve independence with ADLs, transfers & mobility.  Prognosis: Good  End of Session Communication: Care Coordinator  End of Session Patient Position: Bed, 2 rail up, Alarm off, not on at start of session (call light in reach)    Plan:  Treatment Interventions: ADL retraining, Endurance training, Functional transfer training, Neuromuscular reeducation, Compensatory technique education  OT Frequency: 3 times per week  OT Discharge Recommendations: Low intensity level of continued care (24 hour support for safety)  OT - OK to Discharge: Yes (from an O.T. standpoint)    Subjective     Current Problem:  Patient Active Problem List   Diagnosis    SHAKIRA (acute kidney injury) (CMS-[facility])       General:  General  Reason for Referral: OT eval & treat for ADLs/safety (SHAKIRA, elevated troponin, leukocytosis, hypoglycemia, weakness)  Referred By: Jose Garner PA-C  Past Medical History Relevant to Rehab: 5/3/24: drug overdose, admits to snorting a white/yellow powdery substance   PMH: bipolar, opiate use disorder, TIFFANY, daily marijuana use  Prior to Session Communication: Bedside nurse  General Comment: Per conference with RN, patient is medically stable for therapy eval    Precautions:  Precautions Comment: contact plus, R/O C-diff      Pain:  Pain Assessment  Pain Assessment:  (Headache pain, pain right abdomen and hip, not rated)    Objective     Cognition:  Overall Cognitive Status:  (A & O x 3; min cues for safety; impaired judgement)        Home Living:  Home Living Comments: lives with mother who has dementia. Bed/bath 1st floor. Independent ADLs, transfers & mobility without DME.  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 Uses stall shower with seat & grab bar. Independent IADLs, drives.        ADL:  Grooming Assistance: Stand by  UE Dressing Assistance: Stand by  LE Dressing Assistance: Minimal  Toileting Assistance with Device: Minimal  ADL Comments: unable to reach RLE due to pain in hip; may benefit from adaptive equipment    Activity Tolerance:  Endurance: Decreased tolerance for upright activites (Dizziness in standing)    Bed Mobility/Transfers:   Bed Mobility  Bed Mobility:  (supervision supine <-> sit)  Transfers  Transfer:  (supervision sit to stand from edge of bed)    Ambulation/Gait Training:  Functional Mobility  Functional Mobility Performed:  (CGA for balance without device)    Sitting Balance:  Dynamic Sitting Balance  Dynamic Sitting-Balance:  (supervision)    Standing Balance:  Dynamic Standing Balance  Dynamic Standing-Balance:  (CGA without UE support)    Strength:  Strength Comments: ORION 4-/5    Coordination:  Movements are Fluid and Coordinated: Yes       Outcome Measures: [facility] Daily Activity  Putting on and taking off regular lower body clothing: A little  Bathing (including washing, rinsing, drying): A little  Putting on and taking off regular upper body clothing: A little  Toileting, which includes using toilet, bedpan or urinal: A little  Taking care of personal grooming such as brushing teeth: None  Eating Meals: None  Daily Activity - Total Score: 20              EDUCATION:     Education Documentation  ADL Training, taught by Stella Bryant OT at 5/4/2024  1:35 PM.  Learner: Patient  Readiness: Acceptance  Method: Explanation  Response: Verbalizes Understanding    Education Comments  No comments found.        Goals:   Encounter Problems       Encounter Problems (Active)       OT Goals       Increase dynamic stand balance to supervision with UE support to promote increased independence with ADL & functional transfers  (Progressing)       Start:  05/04/24    Expected End:   05/18/24            Increase LE dressing to supervision with adaptive equipment as needed.  (Progressing)       Start:  05/04/24    Expected End:  05/18/24            Increase functional transfers to/from bed, chair & commode to supervision with DME for safety  (Progressing)       Start:  05/04/24    Expected End:  05/18/24            Demonstrate compliance to energy conservation techs and safety techs during ADLs   (Progressing)       Start:  05/04/24    Expected End:  05/18/24

## 2024-05-05 VITALS
RESPIRATION RATE: 18 BRPM | DIASTOLIC BLOOD PRESSURE: 77 MMHG | TEMPERATURE: 97.3 F | HEIGHT: 67 IN | BODY MASS INDEX: 25.11 KG/M2 | WEIGHT: 160 LBS | SYSTOLIC BLOOD PRESSURE: 120 MMHG | OXYGEN SATURATION: 95 % | HEART RATE: 68 BPM

## 2024-05-05 LAB
ALBUMIN SERPL BCP-MCNC: 3.4 G/DL (ref 3.4–5)
ALP SERPL-CCNC: 47 U/L (ref 33–136)
ALT SERPL W P-5'-P-CCNC: 36 U/L (ref 7–45)
ANION GAP SERPL CALC-SCNC: 9 MMOL/L (ref 10–20)
AST SERPL W P-5'-P-CCNC: 61 U/L (ref 9–39)
BACTERIA UR CULT: NORMAL
BILIRUB SERPL-MCNC: 0.4 MG/DL (ref 0–1.2)
BUN SERPL-MCNC: 10 MG/DL (ref 6–23)
CALCIUM SERPL-MCNC: 8.4 MG/DL (ref 8.6–10.3)
CHLORIDE SERPL-SCNC: 111 MMOL/L (ref 98–107)
CHLORIDE UR-SCNC: 45 MMOL/L
CHLORIDE/CREATININE (MMOL/G) IN URINE: 88 MMOL/G CREAT (ref 38–318)
CO2 SERPL-SCNC: 23 MMOL/L (ref 21–32)
CREAT SERPL-MCNC: 0.98 MG/DL (ref 0.5–1.05)
CREAT UR-MCNC: 51.3 MG/DL (ref 20–320)
EGFRCR SERPLBLD CKD-EPI 2021: 66 ML/MIN/1.73M*2
ERYTHROCYTE [DISTWIDTH] IN BLOOD BY AUTOMATED COUNT: 15.4 % (ref 11.5–14.5)
GLUCOSE BLD MANUAL STRIP-MCNC: 103 MG/DL (ref 74–99)
GLUCOSE BLD MANUAL STRIP-MCNC: 95 MG/DL (ref 74–99)
GLUCOSE SERPL-MCNC: 102 MG/DL (ref 74–99)
HCT VFR BLD AUTO: 34 % (ref 36–46)
HGB BLD-MCNC: 11.8 G/DL (ref 12–16)
MCH RBC QN AUTO: 31.5 PG (ref 26–34)
MCHC RBC AUTO-ENTMCNC: 34.7 G/DL (ref 32–36)
MCV RBC AUTO: 91 FL (ref 80–100)
MICROALBUMIN UR-MCNC: <7 MG/L
MICROALBUMIN/CREAT UR: NORMAL MG/G{CREAT}
MYOGLOBIN SERPL-MCNC: 106 UG/L
NRBC BLD-RTO: 0 /100 WBCS (ref 0–0)
OSMOLALITY UR: 261 MOSM/KG (ref 200–1200)
PLATELET # BLD AUTO: 184 X10*3/UL (ref 150–450)
POTASSIUM SERPL-SCNC: 3.7 MMOL/L (ref 3.5–5.3)
POTASSIUM UR-SCNC: 7 MMOL/L
POTASSIUM/CREAT UR-RTO: 14 MMOL/G CREAT
PROT SERPL-MCNC: 5.2 G/DL (ref 6.4–8.2)
RBC # BLD AUTO: 3.75 X10*6/UL (ref 4–5.2)
SODIUM SERPL-SCNC: 139 MMOL/L (ref 136–145)
SODIUM UR-SCNC: 46 MMOL/L
SODIUM/CREAT UR-RTO: 90 MMOL/G CREAT
UREA/CREAT UR-SRTO: 6.1 G/G CREAT
UUN UR-MCNC: 313 MG/DL
WBC # BLD AUTO: 7.4 X10*3/UL (ref 4.4–11.3)

## 2024-05-05 PROCEDURE — G0378 HOSPITAL OBSERVATION PER HR: HCPCS

## 2024-05-05 PROCEDURE — 2500000005 HC RX 250 GENERAL PHARMACY W/O HCPCS: Performed by: PHYSICIAN ASSISTANT

## 2024-05-05 PROCEDURE — 85027 COMPLETE CBC AUTOMATED: CPT | Performed by: INTERNAL MEDICINE

## 2024-05-05 PROCEDURE — 2500000006 HC RX 250 W HCPCS SELF ADMINISTERED DRUGS (ALT 637 FOR ALL PAYERS): Performed by: NURSE PRACTITIONER

## 2024-05-05 PROCEDURE — 82436 ASSAY OF URINE CHLORIDE: CPT | Performed by: INTERNAL MEDICINE

## 2024-05-05 PROCEDURE — 2500000001 HC RX 250 WO HCPCS SELF ADMINISTERED DRUGS (ALT 637 FOR MEDICARE OP): Performed by: INTERNAL MEDICINE

## 2024-05-05 PROCEDURE — 84540 ASSAY OF URINE/UREA-N: CPT | Performed by: INTERNAL MEDICINE

## 2024-05-05 PROCEDURE — 2500000004 HC RX 250 GENERAL PHARMACY W/ HCPCS (ALT 636 FOR OP/ED): Performed by: INTERNAL MEDICINE

## 2024-05-05 PROCEDURE — 82043 UR ALBUMIN QUANTITATIVE: CPT | Performed by: INTERNAL MEDICINE

## 2024-05-05 PROCEDURE — 36415 COLL VENOUS BLD VENIPUNCTURE: CPT | Performed by: INTERNAL MEDICINE

## 2024-05-05 PROCEDURE — 82947 ASSAY GLUCOSE BLOOD QUANT: CPT

## 2024-05-05 PROCEDURE — 96372 THER/PROPH/DIAG INJ SC/IM: CPT | Performed by: PHYSICIAN ASSISTANT

## 2024-05-05 PROCEDURE — 2500000002 HC RX 250 W HCPCS SELF ADMINISTERED DRUGS (ALT 637 FOR MEDICARE OP, ALT 636 FOR OP/ED): Performed by: PHYSICIAN ASSISTANT

## 2024-05-05 PROCEDURE — 84075 ASSAY ALKALINE PHOSPHATASE: CPT | Performed by: INTERNAL MEDICINE

## 2024-05-05 PROCEDURE — 96372 THER/PROPH/DIAG INJ SC/IM: CPT | Performed by: INTERNAL MEDICINE

## 2024-05-05 PROCEDURE — 83935 ASSAY OF URINE OSMOLALITY: CPT | Mod: PARLAB | Performed by: INTERNAL MEDICINE

## 2024-05-05 PROCEDURE — 2500000001 HC RX 250 WO HCPCS SELF ADMINISTERED DRUGS (ALT 637 FOR MEDICARE OP): Performed by: NURSE PRACTITIONER

## 2024-05-05 PROCEDURE — S4991 NICOTINE PATCH NONLEGEND: HCPCS | Performed by: PHYSICIAN ASSISTANT

## 2024-05-05 PROCEDURE — 96376 TX/PRO/DX INJ SAME DRUG ADON: CPT

## 2024-05-05 PROCEDURE — 2500000004 HC RX 250 GENERAL PHARMACY W/ HCPCS (ALT 636 FOR OP/ED): Performed by: PHYSICIAN ASSISTANT

## 2024-05-05 RX ORDER — DICYCLOMINE HYDROCHLORIDE 10 MG/1
10 CAPSULE ORAL 3 TIMES DAILY
Qty: 90 CAPSULE | Refills: 0 | Status: SHIPPED | OUTPATIENT
Start: 2024-05-05 | End: 2024-06-04

## 2024-05-05 RX ORDER — AMOXICILLIN AND CLAVULANATE POTASSIUM 875; 125 MG/1; MG/1
1 TABLET, FILM COATED ORAL 2 TIMES DAILY
Qty: 20 TABLET | Refills: 0 | Status: SHIPPED | OUTPATIENT
Start: 2024-05-05 | End: 2024-05-19 | Stop reason: HOSPADM

## 2024-05-05 RX ORDER — KETOROLAC TROMETHAMINE 30 MG/ML
15 INJECTION, SOLUTION INTRAMUSCULAR; INTRAVENOUS ONCE
Status: COMPLETED | OUTPATIENT
Start: 2024-05-05 | End: 2024-05-05

## 2024-05-05 RX ADMIN — HEPARIN SODIUM 5000 UNITS: 5000 INJECTION INTRAVENOUS; SUBCUTANEOUS at 06:13

## 2024-05-05 RX ADMIN — KETOROLAC TROMETHAMINE 15 MG: 30 INJECTION, SOLUTION INTRAMUSCULAR at 14:16

## 2024-05-05 RX ADMIN — PIPERACILLIN SODIUM AND TAZOBACTAM SODIUM 3.38 G: 3; .375 INJECTION, SOLUTION INTRAVENOUS at 08:52

## 2024-05-05 RX ADMIN — SODIUM CHLORIDE 100 ML/HR: 9 INJECTION, SOLUTION INTRAVENOUS at 08:56

## 2024-05-05 RX ADMIN — DICYCLOMINE HYDROCHLORIDE 10 MG: 10 CAPSULE ORAL at 14:17

## 2024-05-05 RX ADMIN — PANTOPRAZOLE SODIUM 20 MG: 20 TABLET, DELAYED RELEASE ORAL at 06:13

## 2024-05-05 RX ADMIN — BUPROPION HYDROCHLORIDE 300 MG: 150 TABLET, EXTENDED RELEASE ORAL at 08:52

## 2024-05-05 RX ADMIN — NICOTINE 1 PATCH: 14 PATCH, EXTENDED RELEASE TRANSDERMAL at 08:53

## 2024-05-05 RX ADMIN — DICYCLOMINE HYDROCHLORIDE 10 MG: 10 CAPSULE ORAL at 08:53

## 2024-05-05 RX ADMIN — ONDANSETRON HYDROCHLORIDE 4 MG: 4 TABLET, FILM COATED ORAL at 08:57

## 2024-05-05 RX ADMIN — PIPERACILLIN SODIUM AND TAZOBACTAM SODIUM 3.38 G: 3; .375 INJECTION, SOLUTION INTRAVENOUS at 03:10

## 2024-05-05 NOTE — CARE PLAN
Problem: Pain  Goal: My pain/discomfort is manageable  Outcome: Progressing     Problem: Safety  Goal: Patient will be injury free during hospitalization  Outcome: Progressing  Goal: I will remain free of falls  Outcome: Progressing     Problem: Daily Care  Goal: Daily care needs are met  Outcome: Progressing     Problem: Psychosocial Needs  Goal: Demonstrates ability to cope with hospitalization/illness  Outcome: Progressing  Goal: Collaborate with me, my family, and caregiver to identify my specific goals  Outcome: Progressing     Problem: Discharge Barriers  Goal: My discharge needs are met  Outcome: Progressing     Problem: Pain  Goal: Takes deep breaths with improved pain control throughout the shift  Outcome: Progressing  Goal: Turns in bed with improved pain control throughout the shift  Outcome: Progressing  Goal: Walks with improved pain control throughout the shift  Outcome: Progressing  Goal: Performs ADL's with improved pain control throughout shift  Outcome: Progressing  Goal: Participates in PT with improved pain control throughout the shift  Outcome: Progressing  Goal: Free from opioid side effects throughout the shift  Outcome: Progressing  Goal: Free from acute confusion related to pain meds throughout the shift  Outcome: Progressing

## 2024-05-05 NOTE — DISCHARGE SUMMARY
Discharge Diagnosis  SHAKIRA (acute kidney injury) (CMS-HCC)    Issues Requiring Follow-Up  Patient fully evaluated on May 5.  Patient is for discharge today significant improvement in white count noted.  Patient's pain appears to be mostly pain and reproducible with frog-leg maneuver.  Patient had a recent intra-articular injection in the hip but only partial response.  Patient is cleared for discharge on oral antibiotics with close follow-up with PCP.  This is more than    Discharge Meds     Your medication list        START taking these medications        Instructions Last Dose Given Next Dose Due   amoxicillin-pot clavulanate 875-125 mg tablet  Commonly known as: Augmentin      Take 1 tablet by mouth 2 times a day for 10 days.       dicyclomine 10 mg capsule  Commonly known as: Bentyl      Take 1 capsule (10 mg) by mouth 3 times a day.              CONTINUE taking these medications        Instructions Last Dose Given Next Dose Due   amLODIPine 2.5 mg tablet  Commonly known as: Norvasc           buprenorphine-naloxone 8-2 mg SL tablet  Commonly known as: Suboxone           buPROPion  mg 24 hr tablet  Commonly known as: Wellbutrin XL           celecoxib 200 mg capsule  Commonly known as: CeleBREX           doxepin 25 mg capsule  Commonly known as: SINEquan           hydrOXYzine HCL 25 mg tablet  Commonly known as: Atarax           omeprazole 40 mg DR capsule  Commonly known as: PriLOSEC                     Where to Get Your Medications        These medications were sent to [facility]/pharmacy #[id number] [place], [place] - [address] [facility] AT [address] [address]      Hours: 24-hours Phone: [phone]   amoxicillin-pot clavulanate 875-125 mg tablet  dicyclomine 10 mg capsule         Test Results Pending At Discharge  Pending Labs       Order Current Status    Troponin I, High Sensitivity Collected (05/04/24 0341)            Hospital Course         Juan Francisco Chacon,  MD  Physician  Internal Medicine     H&P      Addendum     Date of Service: 5/4/2024  2:52 AM     Addendum       Expand All Collapse All    History Of Present Illness  Trina Santos is a 58 y.o. female with past medical history significant for opiate use disorder, nicotine use disorder, bipolar disorder, and TIFFANY who presents via EMS for suspected drug overdose.  Police/EMS were on scene.  Police reported there was a white powder substance near the patient.  EMS reported patient suffered an accidental drug overdose where she thereafter took Narcan because she was not feeling well.  Patient does tell me she has been sober and in rehab for the past 82 days.  However does state last night she found a yellow/white powdery substance in her room that she then tried.  Says her mother came by and found her face down on the floor which is when EMS was called.  Patient does not know what the substance was.  Denies any suicidal ideation.  Does admit to daily marijuana and cigarette use.  Denies alcohol use.  States she has been abusing opiates since she was 28 years old due to chronic pains.  Patient also admits to nausea and diarrhea for the past week.  Reports multiple episodes of diarrhea daily.  Patient also admits to significant right-sided abdominal pain for the past couple days.  Denies any recent antibiotic use.  Also admits to generalized weakness and fatigue.  Denies headaches, dizziness, shortness of breath, chest pains, emesis, urinary changes, or fever/chills.  Of note, patient did have a positive drug screen test for fentanyl on 3/5/2024 at [facility].     ED course: On arrival to the ED, patient was afebrile, and hemodynamically stable with SpO2 100% on room air.  Glucose 56.  Creatinine 1.67.  AST 59.  Creatinine kinase 614.  Initial troponin was 56, repeat was 66.  WBC 23.3 left left shift.  Chest x-ray is unremarkable.  CT abdomen/pelvis suggesting enteritis.  Patient given 25 g dextrose injection, LR bolus,  NS bolus, Zofran, and Zosyn in the ED.     EKG: Normal sinus rhythm, rate 71 bpm, DC interval 166, QTc 434.  No acute ischemia.     Admitting provider is Dr. Chacon      Past Medical History  Medical History   No past medical history on file.        Surgical History  Surgical History   No past surgical history on file.        Social History  History of opiate abuse, has been using since 28 years old.  States she has been clean for 82 days, however relapsed and snorted an unknown drug last night.  Admits to smoking half a pack a day of cigarettes.  Admits to daily marijuana use, states he uses Gummies.  History of alcohol abuse, however denies having drank alcohol for a long time.     Family History  Family History   No family history on file.        Allergies  Patient has no known allergies.     Review of Systems  10 point review of system negative except as noted above in HPI     Physical Exam  Constitutional:       Appearance: Normal appearance.   HENT:      Head: Normocephalic and atraumatic.      Mouth/Throat:      Mouth: Mucous membranes are moist.      Pharynx: Oropharynx is clear.   Eyes:      Extraocular Movements: Extraocular movements intact.      Conjunctiva/sclera: Conjunctivae normal.      Pupils: Pupils are equal, round, and reactive to light.   Cardiovascular:      Rate and Rhythm: Normal rate and regular rhythm.      Pulses: Normal pulses.      Heart sounds: Normal heart sounds.   Pulmonary:      Effort: Pulmonary effort is normal.      Breath sounds: Normal breath sounds. No wheezing.   Abdominal:      General: Bowel sounds are normal.      Palpations: Abdomen is soft.      Tenderness: There is abdominal tenderness. There is right CVA tenderness and guarding. There is no left CVA tenderness.      Comments: Tenderness to palpation of right flank and right side of abdomen.   Musculoskeletal:         General: Normal range of motion.      Right lower leg: No edema.      Left lower leg: No edema.  "  Skin:     General: Skin is warm and dry.   Neurological:      General: No focal deficit present.      Mental Status: She is alert and oriented to person, place, and time.   Psychiatric:         Mood and Affect: Mood normal.         Behavior: Behavior normal.         Last Recorded Vitals  Blood pressure 102/63, pulse 71, temperature 36.2 °C (97.2 °F), temperature source Temporal, resp. rate 14, height 1.702 m (5' 7\"), weight 72.6 kg (160 lb), SpO2 98%.     Relevant Results        Results for orders placed or performed during the hospital encounter of 05/03/24 (from the past 24 hour(s))   CBC and Auto Differential   Result Value Ref Range     WBC 23.3 (H) 4.4 - 11.3 x10*3/uL     nRBC 0.0 0.0 - 0.0 /100 WBCs     RBC 4.82 4.00 - 5.20 x10*6/uL     Hemoglobin 15.3 12.0 - 16.0 g/dL     Hematocrit 44.2 36.0 - 46.0 %     MCV 92 80 - 100 fL     MCH 31.7 26.0 - 34.0 pg     MCHC 34.6 32.0 - 36.0 g/dL     RDW 15.1 (H) 11.5 - 14.5 %     Platelets 283 150 - 450 x10*3/uL     Neutrophils % 87.2 40.0 - 80.0 %     Immature Granulocytes %, Automated 1.1 (H) 0.0 - 0.9 %     Lymphocytes % 3.7 13.0 - 44.0 %     Monocytes % 7.6 2.0 - 10.0 %     Eosinophils % 0.1 0.0 - 6.0 %     Basophils % 0.3 0.0 - 2.0 %     Neutrophils Absolute 20.34 (H) 1.20 - 7.70 x10*3/uL     Immature Granulocytes Absolute, Automated 0.26 0.00 - 0.70 x10*3/uL     Lymphocytes Absolute 0.86 (L) 1.20 - 4.80 x10*3/uL     Monocytes Absolute 1.76 (H) 0.10 - 1.00 x10*3/uL     Eosinophils Absolute 0.02 0.00 - 0.70 x10*3/uL     Basophils Absolute 0.06 0.00 - 0.10 x10*3/uL   Comprehensive metabolic panel   Result Value Ref Range     Glucose 56 (L) 74 - 99 mg/dL     Sodium 141 136 - 145 mmol/L     Potassium 3.7 3.5 - 5.3 mmol/L     Chloride 107 98 - 107 mmol/L     Bicarbonate 21 21 - 32 mmol/L     Anion Gap 17 10 - 20 mmol/L     Urea Nitrogen 14 6 - 23 mg/dL     Creatinine 1.67 (H) 0.50 - 1.05 mg/dL     eGFR 35 (L) >60 mL/min/1.73m*2     Calcium 9.1 8.6 - 10.3 mg/dL     " Albumin 4.7 3.4 - 5.0 g/dL     Alkaline Phosphatase 67 33 - 136 U/L     Total Protein 7.4 6.4 - 8.2 g/dL     AST 59 (H) 9 - 39 U/L     Bilirubin, Total 0.5 0.0 - 1.2 mg/dL     ALT 38 7 - 45 U/L   Cardiac Enzymes - CPK   Result Value Ref Range     Creatine Kinase 614 (H) 0 - 215 U/L   Troponin I, High Sensitivity   Result Value Ref Range     Troponin I, High Sensitivity 56 (HH) 0 - 13 ng/L   Magnesium   Result Value Ref Range     Magnesium 2.19 1.60 - 2.40 mg/dL   Troponin I, High Sensitivity   Result Value Ref Range     Troponin I, High Sensitivity 66 (HH) 0 - 13 ng/L   POCT GLUCOSE   Result Value Ref Range     POCT Glucose 156 (H) 74 - 99 mg/dL      CT abdomen pelvis w IV contrast     Result Date: 5/4/2024  Interpreted By:  Finkelstein, Evan, STUDY: CT ABDOMEN PELVIS W IV CONTRAST;  5/4/2024 2:00 am   INDICATION: Signs/Symptoms:generalized pain.   COMPARISON: None.   ACCESSION NUMBER(S): [id number]   ORDERING CLINICIAN: PAWAN DING   TECHNIQUE: Axial CT images of the abdomen and pelvis with coronal and sagittal reconstructed images obtained after intravenous administration of 75 mL Omnipaque 350   FINDINGS: LOWER CHEST: Dependent bibasilar atelectasis.   ABDOMEN:   LIVER: Normal attenuation and contour. BILE DUCTS: Normal caliber. GALLBLADDER: No calcified gallstones. No wall thickening. PORTAL VEIN: Patent SPLEEN: Unremarkable. PANCREAS: Unremarkable. ADRENALS: Unremarkable. KIDNEYS, URETERS and URINARY BLADDER: Symmetric renal enhancement. No hydronephrosis or perinephric fluid collection.  Bladder is within normal limits REPRODUCTIVE ORGANS: No pelvic masses.   ABDOMINAL WALL: Fat containing umbilical hernia. Fat containing left inguinal hernia. PERITONEUM: No ascites, free air or fluid collection.   BOWEL: No dilated bowel. Wall thickening of small bowel in the lower pelvis with mild adjacent stranding. Normal appendix.   VESSELS: Moderate aortoiliac calcifications. RETROPERITONEUM: No pathologically  enlarged retroperitoneal lymph nodes.   BONES: No acute osseous abnormality.        Wall thickening of small bowel in the lower pelvis with mild adjacent stranding suggestive of enteritis.   Dependent bibasilar atelectasis.     MACRO: None.   Signed by: Evan Finkelstein 5/4/2024 2:16 AM Dictation workstation:   [id number]     XR chest 1 view     Result Date: 5/3/2024  Interpreted By:  Raúl Hensley, STUDY: XR CHEST 1 VIEW;  5/3/2024 11:51 pm   INDICATION: Signs/Symptoms:ill.   COMPARISON: Chest radiography of 09/30/2020.   ACCESSION NUMBER(S): [id number]   ORDERING CLINICIAN: PAWAN DING   FINDINGS: AP radiograph of the chest was provided.   Limited by portable technique and patient soft tissue attenuation factors. Leads overlie the chest, partially obscuring the field-of-view.   CARDIOMEDIASTINAL SILHOUETTE: Cardiomediastinal silhouette is normal in size and configuration.   LUNGS: No focal consolidation, pleural effusion, or pneumothorax.   ABDOMEN: No remarkable upper abdominal findings.   BONES: No acute osseous changes.        1.  No evidence of acute cardiopulmonary process.       MACRO: None   Signed by: Raúl Hensley 5/3/2024 11:59 PM Dictation workstation:   [id number]        Assessment/Plan   Active Problems:  There are no active Hospital Problems.     Trina Santos is a 58 y.o. female with opiate use disorder presents via EMS for suspected drug overdose. EMS reported patient suffered an accidental drug overdose where she thereafter took Narcan because she was not feeling well.  Patient does tell me she has been sober and in rehab for the past 82 days.  However does state last night she found a yellow/white powdery substance in her room that she then tried. Patient does not know what the substance was.  Denies any suicidal ideation.  Patient would also like pain management consult either inpatient or after discharge.     CODE STATUS: Full code     #Acute kidney injury (creatinine 1.67 today, creatinine  0.98 on 4/30/2024)  #Accidental drug overdose, patient gave herself Narcan at home  #Elevated CK, 614  #Elevated troponin  #Weakness  Admit as inpatient with telemetry monitoring  Suspected drug-induced SHAKIRA/rhabdo  IV fluids  Repeat CK in the a.m., repeat troponin ordered, trend  Urinalysis, drug screen ordered  Q 4 vitals  CBC, BMP in the a.m.  Swallow assessment, then okay for regular diet-see how patient tolerates  Tylenol, Zofran as needed  PT/OT for evaluation and treatment  Consider nephrology consult if SHAKIRA/CK level does not improve after fluids     #Enteritis  #Abdominal pain  #Diarrhea  #Leukocytosis with left shift  IV Zosyn every 6 hours  Monitor with a.m. labs  C. difficile PCR  Stool pathogen PCR     #Hypoglycemia  25 g IV dextrose given in the ED  Hypoglycemia protocol ordered     #Nicotine use disorder  Nicotine patch ordered     Chronic conditions:  Opiate/nicotine use disorder, bipolar disorder, TIFFANY     #DVT prophylaxis  Ambulation  Heparin           I spent 60 minutes in the professional and overall care of this patient.  Patient fully evaluated and plan as above                   Revision History         Pertinent Physical Exam At Time of Discharge  Physical Exam    Outpatient Follow-Up  No future appointments.      Juan Francisco Chacon MD

## 2024-05-07 LAB
ATRIAL RATE: 71 BPM
P AXIS: 85 DEGREES
P OFFSET: 202 MS
P ONSET: 135 MS
PR INTERVAL: 166 MS
Q ONSET: 218 MS
QRS COUNT: 12 BEATS
QRS DURATION: 80 MS
QT INTERVAL: 400 MS
QTC CALCULATION(BAZETT): 434 MS
QTC FREDERICIA: 423 MS
R AXIS: 82 DEGREES
T AXIS: 60 DEGREES
T OFFSET: 418 MS
VENTRICULAR RATE: 71 BPM

## 2024-05-18 ENCOUNTER — APPOINTMENT (OUTPATIENT)
Dept: CARDIOLOGY | Facility: HOSPITAL | Age: 59
End: 2024-05-18
Payer: COMMERCIAL

## 2024-05-18 ENCOUNTER — APPOINTMENT (OUTPATIENT)
Dept: RADIOLOGY | Facility: HOSPITAL | Age: 59
End: 2024-05-18
Payer: COMMERCIAL

## 2024-05-18 ENCOUNTER — HOSPITAL ENCOUNTER (OUTPATIENT)
Dept: CARDIOLOGY | Facility: HOSPITAL | Age: 59
Discharge: HOME | End: 2024-05-18
Payer: COMMERCIAL

## 2024-05-18 ENCOUNTER — HOSPITAL ENCOUNTER (OUTPATIENT)
Facility: HOSPITAL | Age: 59
Setting detail: OBSERVATION
Discharge: HOME | End: 2024-05-19
Attending: STUDENT IN AN ORGANIZED HEALTH CARE EDUCATION/TRAINING PROGRAM | Admitting: INTERNAL MEDICINE
Payer: COMMERCIAL

## 2024-05-18 DIAGNOSIS — R79.89 ELEVATED TROPONIN: ICD-10-CM

## 2024-05-18 DIAGNOSIS — T50.901A ACCIDENTAL DRUG OVERDOSE, INITIAL ENCOUNTER: Primary | ICD-10-CM

## 2024-05-18 LAB
ALBUMIN SERPL BCP-MCNC: 4.5 G/DL (ref 3.4–5)
ALP SERPL-CCNC: 71 U/L (ref 33–136)
ALT SERPL W P-5'-P-CCNC: 61 U/L (ref 7–45)
ANION GAP BLDV CALCULATED.4IONS-SCNC: 23 MMOL/L (ref 10–25)
ANION GAP SERPL CALC-SCNC: 20 MMOL/L (ref 10–20)
AST SERPL W P-5'-P-CCNC: 55 U/L (ref 9–39)
BASE EXCESS BLDV CALC-SCNC: -12 MMOL/L (ref -2–3)
BASOPHILS # BLD AUTO: 0.06 X10*3/UL (ref 0–0.1)
BASOPHILS NFR BLD AUTO: 0.6 %
BILIRUB SERPL-MCNC: 0.4 MG/DL (ref 0–1.2)
BODY TEMPERATURE: 37 DEGREES CELSIUS
BUN SERPL-MCNC: 14 MG/DL (ref 6–23)
CA-I BLDV-SCNC: 1.25 MMOL/L (ref 1.1–1.33)
CALCIUM SERPL-MCNC: 9.1 MG/DL (ref 8.6–10.3)
CARDIAC TROPONIN I PNL SERPL HS: 15 NG/L (ref 0–13)
CARDIAC TROPONIN I PNL SERPL HS: 35 NG/L (ref 0–13)
CARDIAC TROPONIN I PNL SERPL HS: 61 NG/L (ref 0–13)
CHLORIDE BLDV-SCNC: 100 MMOL/L (ref 98–107)
CHLORIDE SERPL-SCNC: 105 MMOL/L (ref 98–107)
CO2 SERPL-SCNC: 17 MMOL/L (ref 21–32)
CREAT SERPL-MCNC: 1.93 MG/DL (ref 0.5–1.05)
CRITICAL CALL TIME: 455
CRITICAL CALLED BY: ABNORMAL
CRITICAL CALLED TO: ABNORMAL
CRITICAL READ BACK: ABNORMAL
EGFRCR SERPLBLD CKD-EPI 2021: 29 ML/MIN/1.73M*2
EOSINOPHIL # BLD AUTO: 0.02 X10*3/UL (ref 0–0.7)
EOSINOPHIL NFR BLD AUTO: 0.2 %
ERYTHROCYTE [DISTWIDTH] IN BLOOD BY AUTOMATED COUNT: 14.6 % (ref 11.5–14.5)
GLUCOSE BLDV-MCNC: 372 MG/DL (ref 74–99)
GLUCOSE SERPL-MCNC: 412 MG/DL (ref 74–99)
HCO3 BLDV-SCNC: 19.1 MMOL/L (ref 22–26)
HCT VFR BLD AUTO: 42.6 % (ref 36–46)
HCT VFR BLD EST: 43 % (ref 36–46)
HGB BLD-MCNC: 14.1 G/DL (ref 12–16)
HGB BLDV-MCNC: 14.4 G/DL (ref 12–16)
HOLD SPECIMEN: NORMAL
IMM GRANULOCYTES # BLD AUTO: 0.42 X10*3/UL (ref 0–0.7)
IMM GRANULOCYTES NFR BLD AUTO: 4.3 % (ref 0–0.9)
INHALED O2 CONCENTRATION: 21 %
LACTATE BLDV-SCNC: 6.8 MMOL/L (ref 0.4–2)
LACTATE SERPL-SCNC: 1.1 MMOL/L (ref 0.4–2)
LACTATE SERPL-SCNC: 1.1 MMOL/L (ref 0.4–2)
LACTATE SERPL-SCNC: 3 MMOL/L (ref 0.4–2)
LYMPHOCYTES # BLD AUTO: 1.71 X10*3/UL (ref 1.2–4.8)
LYMPHOCYTES NFR BLD AUTO: 17.7 %
MAGNESIUM SERPL-MCNC: 2.51 MG/DL (ref 1.6–2.4)
MCH RBC QN AUTO: 32.6 PG (ref 26–34)
MCHC RBC AUTO-ENTMCNC: 33.1 G/DL (ref 32–36)
MCV RBC AUTO: 99 FL (ref 80–100)
MONOCYTES # BLD AUTO: 0.19 X10*3/UL (ref 0.1–1)
MONOCYTES NFR BLD AUTO: 2 %
NEUTROPHILS # BLD AUTO: 7.28 X10*3/UL (ref 1.2–7.7)
NEUTROPHILS NFR BLD AUTO: 75.2 %
NRBC BLD-RTO: 0 /100 WBCS (ref 0–0)
OXYHGB MFR BLDV: 29.2 % (ref 45–75)
PCO2 BLDV: 66 MM HG (ref 41–51)
PH BLDV: 7.07 PH (ref 7.33–7.43)
PLATELET # BLD AUTO: 288 X10*3/UL (ref 150–450)
PO2 BLDV: 23 MM HG (ref 35–45)
POTASSIUM BLDV-SCNC: 5.5 MMOL/L (ref 3.5–5.3)
POTASSIUM SERPL-SCNC: 5.4 MMOL/L (ref 3.5–5.3)
PROT SERPL-MCNC: 6.9 G/DL (ref 6.4–8.2)
RBC # BLD AUTO: 4.32 X10*6/UL (ref 4–5.2)
SAO2 % BLDV: 31 % (ref 45–75)
SODIUM BLDV-SCNC: 137 MMOL/L (ref 136–145)
SODIUM SERPL-SCNC: 137 MMOL/L (ref 136–145)
WBC # BLD AUTO: 9.7 X10*3/UL (ref 4.4–11.3)

## 2024-05-18 PROCEDURE — 99285 EMERGENCY DEPT VISIT HI MDM: CPT | Mod: 25

## 2024-05-18 PROCEDURE — 36415 COLL VENOUS BLD VENIPUNCTURE: CPT | Performed by: STUDENT IN AN ORGANIZED HEALTH CARE EDUCATION/TRAINING PROGRAM

## 2024-05-18 PROCEDURE — 93005 ELECTROCARDIOGRAM TRACING: CPT

## 2024-05-18 PROCEDURE — 84132 ASSAY OF SERUM POTASSIUM: CPT | Performed by: STUDENT IN AN ORGANIZED HEALTH CARE EDUCATION/TRAINING PROGRAM

## 2024-05-18 PROCEDURE — 2500000001 HC RX 250 WO HCPCS SELF ADMINISTERED DRUGS (ALT 637 FOR MEDICARE OP)

## 2024-05-18 PROCEDURE — 99222 1ST HOSP IP/OBS MODERATE 55: CPT | Performed by: INTERNAL MEDICINE

## 2024-05-18 PROCEDURE — G0378 HOSPITAL OBSERVATION PER HR: HCPCS

## 2024-05-18 PROCEDURE — 2500000004 HC RX 250 GENERAL PHARMACY W/ HCPCS (ALT 636 FOR OP/ED)

## 2024-05-18 PROCEDURE — 83605 ASSAY OF LACTIC ACID: CPT

## 2024-05-18 PROCEDURE — 2500000001 HC RX 250 WO HCPCS SELF ADMINISTERED DRUGS (ALT 637 FOR MEDICARE OP): Performed by: INTERNAL MEDICINE

## 2024-05-18 PROCEDURE — 2500000002 HC RX 250 W HCPCS SELF ADMINISTERED DRUGS (ALT 637 FOR MEDICARE OP, ALT 636 FOR OP/ED)

## 2024-05-18 PROCEDURE — 84484 ASSAY OF TROPONIN QUANT: CPT | Performed by: STUDENT IN AN ORGANIZED HEALTH CARE EDUCATION/TRAINING PROGRAM

## 2024-05-18 PROCEDURE — 2500000005 HC RX 250 GENERAL PHARMACY W/O HCPCS

## 2024-05-18 PROCEDURE — 2500000004 HC RX 250 GENERAL PHARMACY W/ HCPCS (ALT 636 FOR OP/ED): Performed by: INTERNAL MEDICINE

## 2024-05-18 PROCEDURE — S4991 NICOTINE PATCH NONLEGEND: HCPCS

## 2024-05-18 PROCEDURE — 96361 HYDRATE IV INFUSION ADD-ON: CPT

## 2024-05-18 PROCEDURE — 83735 ASSAY OF MAGNESIUM: CPT | Performed by: STUDENT IN AN ORGANIZED HEALTH CARE EDUCATION/TRAINING PROGRAM

## 2024-05-18 PROCEDURE — 96376 TX/PRO/DX INJ SAME DRUG ADON: CPT

## 2024-05-18 PROCEDURE — 93005 ELECTROCARDIOGRAM TRACING: CPT | Mod: 59

## 2024-05-18 PROCEDURE — 2500000004 HC RX 250 GENERAL PHARMACY W/ HCPCS (ALT 636 FOR OP/ED): Performed by: STUDENT IN AN ORGANIZED HEALTH CARE EDUCATION/TRAINING PROGRAM

## 2024-05-18 PROCEDURE — 71045 X-RAY EXAM CHEST 1 VIEW: CPT

## 2024-05-18 PROCEDURE — 71045 X-RAY EXAM CHEST 1 VIEW: CPT | Performed by: STUDENT IN AN ORGANIZED HEALTH CARE EDUCATION/TRAINING PROGRAM

## 2024-05-18 PROCEDURE — 85025 COMPLETE CBC W/AUTO DIFF WBC: CPT | Performed by: STUDENT IN AN ORGANIZED HEALTH CARE EDUCATION/TRAINING PROGRAM

## 2024-05-18 PROCEDURE — 96374 THER/PROPH/DIAG INJ IV PUSH: CPT | Mod: 59

## 2024-05-18 PROCEDURE — 83605 ASSAY OF LACTIC ACID: CPT | Performed by: STUDENT IN AN ORGANIZED HEALTH CARE EDUCATION/TRAINING PROGRAM

## 2024-05-18 PROCEDURE — 96372 THER/PROPH/DIAG INJ SC/IM: CPT

## 2024-05-18 PROCEDURE — 84484 ASSAY OF TROPONIN QUANT: CPT

## 2024-05-18 RX ORDER — HEPARIN SODIUM 5000 [USP'U]/ML
5000 INJECTION, SOLUTION INTRAVENOUS; SUBCUTANEOUS EVERY 8 HOURS
Status: DISCONTINUED | OUTPATIENT
Start: 2024-05-18 | End: 2024-05-19 | Stop reason: HOSPADM

## 2024-05-18 RX ORDER — BUPRENORPHINE HYDROCHLORIDE 8 MG/1
8 TABLET SUBLINGUAL 2 TIMES DAILY
Status: DISCONTINUED | OUTPATIENT
Start: 2024-05-18 | End: 2024-05-19 | Stop reason: HOSPADM

## 2024-05-18 RX ORDER — SODIUM CHLORIDE 9 MG/ML
75 INJECTION, SOLUTION INTRAVENOUS CONTINUOUS
Status: DISCONTINUED | OUTPATIENT
Start: 2024-05-18 | End: 2024-05-19 | Stop reason: HOSPADM

## 2024-05-18 RX ORDER — DICYCLOMINE HYDROCHLORIDE 10 MG/1
10 CAPSULE ORAL 3 TIMES DAILY
Status: DISCONTINUED | OUTPATIENT
Start: 2024-05-18 | End: 2024-05-19 | Stop reason: HOSPADM

## 2024-05-18 RX ORDER — DULOXETIN HYDROCHLORIDE 30 MG/1
30 CAPSULE, DELAYED RELEASE ORAL DAILY
Status: DISCONTINUED | OUTPATIENT
Start: 2024-05-18 | End: 2024-05-19 | Stop reason: HOSPADM

## 2024-05-18 RX ORDER — BUPRENORPHINE HYDROCHLORIDE AND NALOXONE HYDROCHLORIDE DIHYDRATE 8; 2 MG/1; MG/1
1 TABLET SUBLINGUAL 2 TIMES DAILY
Status: DISCONTINUED | OUTPATIENT
Start: 2024-05-18 | End: 2024-05-18

## 2024-05-18 RX ORDER — IBUPROFEN 200 MG
1 TABLET ORAL DAILY
Status: DISCONTINUED | OUTPATIENT
Start: 2024-05-18 | End: 2024-05-19 | Stop reason: HOSPADM

## 2024-05-18 RX ORDER — CLONIDINE HYDROCHLORIDE 0.1 MG/1
0.1 TABLET ORAL EVERY 4 HOURS PRN
Status: DISCONTINUED | OUTPATIENT
Start: 2024-05-18 | End: 2024-05-19 | Stop reason: HOSPADM

## 2024-05-18 RX ORDER — BUPROPION HYDROCHLORIDE 150 MG/1
300 TABLET ORAL DAILY
Status: DISCONTINUED | OUTPATIENT
Start: 2024-05-18 | End: 2024-05-19 | Stop reason: HOSPADM

## 2024-05-18 RX ORDER — PANTOPRAZOLE SODIUM 40 MG/1
40 TABLET, DELAYED RELEASE ORAL
Status: DISCONTINUED | OUTPATIENT
Start: 2024-05-19 | End: 2024-05-19 | Stop reason: HOSPADM

## 2024-05-18 RX ORDER — KETOROLAC TROMETHAMINE 30 MG/ML
15 INJECTION, SOLUTION INTRAMUSCULAR; INTRAVENOUS EVERY 8 HOURS PRN
Status: DISCONTINUED | OUTPATIENT
Start: 2024-05-18 | End: 2024-05-19 | Stop reason: HOSPADM

## 2024-05-18 RX ORDER — HYDROXYZINE PAMOATE 25 MG/1
25 CAPSULE ORAL EVERY 6 HOURS PRN
Status: DISCONTINUED | OUTPATIENT
Start: 2024-05-18 | End: 2024-05-19 | Stop reason: HOSPADM

## 2024-05-18 RX ORDER — ACETAMINOPHEN 325 MG/1
650 TABLET ORAL EVERY 4 HOURS PRN
Status: DISCONTINUED | OUTPATIENT
Start: 2024-05-18 | End: 2024-05-19 | Stop reason: HOSPADM

## 2024-05-18 RX ORDER — LIDOCAINE 560 MG/1
1 PATCH PERCUTANEOUS; TOPICAL; TRANSDERMAL DAILY
Status: DISCONTINUED | OUTPATIENT
Start: 2024-05-18 | End: 2024-05-19 | Stop reason: HOSPADM

## 2024-05-18 RX ADMIN — SODIUM CHLORIDE 75 ML/HR: 9 INJECTION, SOLUTION INTRAVENOUS at 11:21

## 2024-05-18 RX ADMIN — HEPARIN SODIUM 5000 UNITS: 5000 INJECTION INTRAVENOUS; SUBCUTANEOUS at 20:16

## 2024-05-18 RX ADMIN — BUPROPION HYDROCHLORIDE 300 MG: 150 TABLET, FILM COATED, EXTENDED RELEASE ORAL at 11:21

## 2024-05-18 RX ADMIN — SODIUM CHLORIDE, POTASSIUM CHLORIDE, SODIUM LACTATE AND CALCIUM CHLORIDE 1000 ML: 600; 310; 30; 20 INJECTION, SOLUTION INTRAVENOUS at 05:10

## 2024-05-18 RX ADMIN — ACETAMINOPHEN 650 MG: 325 TABLET ORAL at 11:22

## 2024-05-18 RX ADMIN — NICOTINE 1 PATCH: 21 PATCH, EXTENDED RELEASE TRANSDERMAL at 11:22

## 2024-05-18 RX ADMIN — BUPRENORPHINE 8 MG: 8 TABLET SUBLINGUAL at 11:21

## 2024-05-18 RX ADMIN — KETOROLAC TROMETHAMINE 15 MG: 30 INJECTION, SOLUTION INTRAMUSCULAR at 22:59

## 2024-05-18 RX ADMIN — ACETAMINOPHEN 650 MG: 325 TABLET ORAL at 23:02

## 2024-05-18 RX ADMIN — HEPARIN SODIUM 5000 UNITS: 5000 INJECTION INTRAVENOUS; SUBCUTANEOUS at 11:22

## 2024-05-18 RX ADMIN — LIDOCAINE 1 PATCH: 4 PATCH TOPICAL at 11:22

## 2024-05-18 RX ADMIN — DULOXETINE HYDROCHLORIDE 30 MG: 30 CAPSULE, DELAYED RELEASE ORAL at 15:03

## 2024-05-18 RX ADMIN — KETOROLAC TROMETHAMINE 15 MG: 30 INJECTION, SOLUTION INTRAMUSCULAR at 15:00

## 2024-05-18 SDOH — ECONOMIC STABILITY: INCOME INSECURITY: IN THE LAST 12 MONTHS, WAS THERE A TIME WHEN YOU WERE NOT ABLE TO PAY THE MORTGAGE OR RENT ON TIME?: NO

## 2024-05-18 SDOH — SOCIAL STABILITY: SOCIAL INSECURITY: DOES ANYONE TRY TO KEEP YOU FROM HAVING/CONTACTING OTHER FRIENDS OR DOING THINGS OUTSIDE YOUR HOME?: NO

## 2024-05-18 SDOH — SOCIAL STABILITY: SOCIAL INSECURITY: DO YOU FEEL UNSAFE GOING BACK TO THE PLACE WHERE YOU ARE LIVING?: NO

## 2024-05-18 SDOH — SOCIAL STABILITY: SOCIAL INSECURITY: DO YOU FEEL ANYONE HAS EXPLOITED OR TAKEN ADVANTAGE OF YOU FINANCIALLY OR OF YOUR PERSONAL PROPERTY?: NO

## 2024-05-18 SDOH — SOCIAL STABILITY: SOCIAL INSECURITY: HAS ANYONE EVER THREATENED TO HURT YOUR FAMILY OR YOUR PETS?: NO

## 2024-05-18 SDOH — SOCIAL STABILITY: SOCIAL INSECURITY: HAVE YOU HAD ANY THOUGHTS OF HARMING ANYONE ELSE?: NO

## 2024-05-18 SDOH — SOCIAL STABILITY: SOCIAL INSECURITY: ARE THERE ANY APPARENT SIGNS OF INJURIES/BEHAVIORS THAT COULD BE RELATED TO ABUSE/NEGLECT?: NO

## 2024-05-18 SDOH — SOCIAL STABILITY: SOCIAL INSECURITY: HAVE YOU HAD THOUGHTS OF HARMING ANYONE ELSE?: NO

## 2024-05-18 SDOH — ECONOMIC STABILITY: INCOME INSECURITY: HOW HARD IS IT FOR YOU TO PAY FOR THE VERY BASICS LIKE FOOD, HOUSING, MEDICAL CARE, AND HEATING?: NOT VERY HARD

## 2024-05-18 SDOH — ECONOMIC STABILITY: HOUSING INSECURITY
IN THE LAST 12 MONTHS, WAS THERE A TIME WHEN YOU DID NOT HAVE A STEADY PLACE TO SLEEP OR SLEPT IN A SHELTER (INCLUDING NOW)?: NO

## 2024-05-18 SDOH — SOCIAL STABILITY: SOCIAL INSECURITY: ABUSE: ADULT

## 2024-05-18 SDOH — ECONOMIC STABILITY: HOUSING INSECURITY: IN THE LAST 12 MONTHS, HOW MANY PLACES HAVE YOU LIVED?: 1

## 2024-05-18 SDOH — SOCIAL STABILITY: SOCIAL INSECURITY: WERE YOU ABLE TO COMPLETE ALL THE BEHAVIORAL HEALTH SCREENINGS?: YES

## 2024-05-18 SDOH — ECONOMIC STABILITY: TRANSPORTATION INSECURITY
IN THE PAST 12 MONTHS, HAS LACK OF TRANSPORTATION KEPT YOU FROM MEETINGS, WORK, OR FROM GETTING THINGS NEEDED FOR DAILY LIVING?: NO

## 2024-05-18 SDOH — ECONOMIC STABILITY: TRANSPORTATION INSECURITY
IN THE PAST 12 MONTHS, HAS THE LACK OF TRANSPORTATION KEPT YOU FROM MEDICAL APPOINTMENTS OR FROM GETTING MEDICATIONS?: NO

## 2024-05-18 SDOH — SOCIAL STABILITY: SOCIAL INSECURITY: ARE YOU OR HAVE YOU BEEN THREATENED OR ABUSED PHYSICALLY, EMOTIONALLY, OR SEXUALLY BY ANYONE?: NO

## 2024-05-18 ASSESSMENT — ACTIVITIES OF DAILY LIVING (ADL)
HEARING - LEFT EAR: FUNCTIONAL
GROOMING: INDEPENDENT
JUDGMENT_ADEQUATE_SAFELY_COMPLETE_DAILY_ACTIVITIES: YES
DRESSING YOURSELF: INDEPENDENT
FEEDING YOURSELF: INDEPENDENT
TOILETING: NEEDS ASSISTANCE
WALKS IN HOME: NEEDS ASSISTANCE
ADEQUATE_TO_COMPLETE_ADL: YES
BATHING: NEEDS ASSISTANCE
HEARING - RIGHT EAR: FUNCTIONAL
PATIENT'S MEMORY ADEQUATE TO SAFELY COMPLETE DAILY ACTIVITIES?: YES

## 2024-05-18 ASSESSMENT — ENCOUNTER SYMPTOMS
FEVER: 0
COLOR CHANGE: 0
RHINORRHEA: 0
NAUSEA: 0
HEADACHES: 1
HEMATURIA: 0
AGITATION: 0
CHILLS: 1
DIAPHORESIS: 0
DIARRHEA: 1
VOMITING: 0
CHEST TIGHTNESS: 0
DIZZINESS: 0
DYSURIA: 0
SHORTNESS OF BREATH: 0
ABDOMINAL PAIN: 0
ARTHRALGIAS: 1
PALPITATIONS: 0
COUGH: 0
DIFFICULTY URINATING: 0

## 2024-05-18 ASSESSMENT — COGNITIVE AND FUNCTIONAL STATUS - GENERAL
DRESSING REGULAR LOWER BODY CLOTHING: A LITTLE
TOILETING: A LITTLE
HELP NEEDED FOR BATHING: A LITTLE
MOVING FROM LYING ON BACK TO SITTING ON SIDE OF FLAT BED WITH BEDRAILS: A LITTLE
HELP NEEDED FOR BATHING: A LITTLE
CLIMB 3 TO 5 STEPS WITH RAILING: A LOT
MOVING TO AND FROM BED TO CHAIR: A LITTLE
MOBILITY SCORE: 17
DAILY ACTIVITIY SCORE: 19
DRESSING REGULAR UPPER BODY CLOTHING: A LITTLE
MOVING FROM LYING ON BACK TO SITTING ON SIDE OF FLAT BED WITH BEDRAILS: A LITTLE
MOVING TO AND FROM BED TO CHAIR: A LITTLE
WALKING IN HOSPITAL ROOM: A LITTLE
TURNING FROM BACK TO SIDE WHILE IN FLAT BAD: A LITTLE
DRESSING REGULAR UPPER BODY CLOTHING: A LITTLE
MOBILITY SCORE: 17
DAILY ACTIVITIY SCORE: 19
PERSONAL GROOMING: A LITTLE
TURNING FROM BACK TO SIDE WHILE IN FLAT BAD: A LITTLE
CLIMB 3 TO 5 STEPS WITH RAILING: A LOT
WALKING IN HOSPITAL ROOM: A LITTLE
STANDING UP FROM CHAIR USING ARMS: A LITTLE
TOILETING: A LITTLE
STANDING UP FROM CHAIR USING ARMS: A LITTLE
PATIENT BASELINE BEDBOUND: NO
DRESSING REGULAR LOWER BODY CLOTHING: A LITTLE
PERSONAL GROOMING: A LITTLE

## 2024-05-18 ASSESSMENT — LIFESTYLE VARIABLES
HAVE PEOPLE ANNOYED YOU BY CRITICIZING YOUR DRINKING: NO
HOW OFTEN DO YOU HAVE A DRINK CONTAINING ALCOHOL: MONTHLY OR LESS
AUDIT-C TOTAL SCORE: 2
TOTAL SCORE: 0
TOTAL_SCORE: 2
EVER FELT BAD OR GUILTY ABOUT YOUR DRINKING: NO
AUDIT-C TOTAL SCORE: 2
TOTAL_SCORE: 2
HOW MANY STANDARD DRINKS CONTAINING ALCOHOL DO YOU HAVE ON A TYPICAL DAY: 1 OR 2
EVER HAD A DRINK FIRST THING IN THE MORNING TO STEADY YOUR NERVES TO GET RID OF A HANGOVER: NO
HOW OFTEN DO YOU HAVE 6 OR MORE DRINKS ON ONE OCCASION: LESS THAN MONTHLY
HAVE YOU EVER FELT YOU SHOULD CUT DOWN ON YOUR DRINKING: NO
SKIP TO QUESTIONS 9-10: 0

## 2024-05-18 ASSESSMENT — PAIN DESCRIPTION - ORIENTATION
ORIENTATION: RIGHT

## 2024-05-18 ASSESSMENT — PAIN DESCRIPTION - LOCATION
LOCATION: HIP
LOCATION: HIP
LOCATION: HEAD
LOCATION: HIP

## 2024-05-18 ASSESSMENT — PAIN SCALES - GENERAL
PAINLEVEL_OUTOF10: 8
PAINLEVEL_OUTOF10: 6
PAINLEVEL_OUTOF10: 3
PAINLEVEL_OUTOF10: 8
PAINLEVEL_OUTOF10: 0 - NO PAIN

## 2024-05-18 ASSESSMENT — PAIN - FUNCTIONAL ASSESSMENT
PAIN_FUNCTIONAL_ASSESSMENT: 0-10

## 2024-05-18 ASSESSMENT — COLUMBIA-SUICIDE SEVERITY RATING SCALE - C-SSRS
2. HAVE YOU ACTUALLY HAD ANY THOUGHTS OF KILLING YOURSELF?: NO
6. HAVE YOU EVER DONE ANYTHING, STARTED TO DO ANYTHING, OR PREPARED TO DO ANYTHING TO END YOUR LIFE?: NO
1. IN THE PAST MONTH, HAVE YOU WISHED YOU WERE DEAD OR WISHED YOU COULD GO TO SLEEP AND NOT WAKE UP?: NO

## 2024-05-18 ASSESSMENT — PATIENT HEALTH QUESTIONNAIRE - PHQ9
2. FEELING DOWN, DEPRESSED OR HOPELESS: SEVERAL DAYS
1. LITTLE INTEREST OR PLEASURE IN DOING THINGS: SEVERAL DAYS
SUM OF ALL RESPONSES TO PHQ9 QUESTIONS 1 & 2: 2

## 2024-05-18 NOTE — PROGRESS NOTES
05/18/24 1308   Discharge Planning   Living Arrangements Parent   Support Systems Family members   Assistance Needed independent   Type of Residence Private residence   Number of Stairs to Enter Residence 3   Do you have animals or pets at home? Yes   Type of Animals or Pets cat   Home or Post Acute Services None   Patient expects to be discharged to: home     Met with patent at bedside. Introduced self and role as care coordinator. Demographics verified. Patient lives at home with her mother and is the care giver her. Patient denies any needs at this time.

## 2024-05-18 NOTE — CARE PLAN
The patient's goals for the shift include decreased hip pain.     The clinical goals for the shift include patient will have decreased pain during shift.    Over the shift, the patient did not make progress toward the following goals. Barriers to progression include chronic  right hip pain with surgery needed in the future. Recommendations to address these barriers include medication, consults.

## 2024-05-18 NOTE — H&P
"History Of Present Illness  Trina Santos is a 59 y.o. female with a PMHx of opiate use disorder, nicotine use disorder, bipolar 2 disorder, rheumatoid arthritis, longstanding atraumatic right hip pain s/p R hip CSI on 4/23, and TIFFANY who presents to the ED in the setting of suspected drug overdose. Patient was recently admitted to the hospital on 5/4/24 for drug overdose, where she admitted she has been abusing opiates since she was 28 years old due to chronic pain. Toxicology screen was positive for fentanyl and cannabis. Previous to this admission on 5/4, patient states she had been clean for 82 days after completing a 7-day detox at [facility]. Patient was also treated for SHAKIRA, elevated CK, enteritis, and hypoglycemia while she was hospitalized. She was observed overnight and subsequently discharged on 5/5 in stable condition. Patient then presented to [facility] ED yesterday with complaint of hip pain, and was treated witih Flexeril, Toradol, and lidocaine patches and discharged home same day. According to ED provider note, patient's sister states patient was upset that she did not receive any opiate medications for her pain. The patient then drove home and may have overdosed in the garage. Her sister went to check on her after mother called out saying that the patient was unresponsive. 911 was called and EMS administered Narcan on arrival which made patient alert and aware.  Upon my arrival to the ED, I did discuss this with patient and she states she \"does not remember any of it.\"  She states that she did go to the ED yesterday, and then last night was still experiencing pain, so she took Tylenol, Celebrex and after that did not help her pain, she called her friend who gave her 1 Vicodin. She states she is still experiencing pain in her right hip.  It is worse with walking.  She tells me the steroid shot she got on 4/23 only relieved her pain for about 1 week.  X-ray of right hip and pelvis from 4/30/2024 shows " end-stage right hip osteoarthritis without significant deformity.  Patient states that her orthopedic doctor told her she is not able to get a hip replacement for another 2 to 3 months.  Patient also complains of persistent nonbloody diarrhea for the past 2 weeks.  She was recently on a course of Augmentin.  She also tells me she has been experiencing a left-sided headache since her last hospital admission when she hit her head on the ground after overdosing.  She states the pain will wake her up at night. She otherwise denies fevers, chest pain, shortness of breath, vision changes, abdominal pain, nausea/vomiting, numbness/tingling, diaphoresis, tremor.  The patient tells me that she does not take her Suboxone regularly-she is supposed to take it 2 times per day, but stopped taking it within the past 2 weeks.  Patient admits to tobacco use, 1ppd, marijuana and fentanyl use, but states she is not currently using alcohol. OARRS reviewed.    ED Course: Vital signs stable, afebrile on room air. Labs and imaging performed, revealing glucose 412, potassium 5.4, creatinine 1.93 (0.98 on 5/5), ALT 61, AST 55, magnesium 2.51, initial troponin 15, repeat troponin 35, lactate 3.0, no leukocytosis, VBG shows pH 7.07, pCO2 66, pCO2 23.  CXR shows no acute cardiopulmonary process.  EKG, per ED physician, rate 87, , , QTc 500, NSR, no ischemia.  Urinalysis and drug screen ordered and pending.  1000 mL LR bolus administered in the ED.  Patient is to be admitted observation under Dr. Chacon.     Past Medical History  Past Medical History:   Diagnosis Date    Anxiety     Arthritis     Nicotine use disorder     Opiate use        Surgical History  Past Surgical History:   Procedure Laterality Date    FRACTURE SURGERY Left     leg    TOTAL KNEE ARTHROPLASTY Right         Social History  She reports that she has been smoking cigarettes. She started smoking about 49 years ago. She has a 24.6 pack-year smoking history. She  has never used smokeless tobacco. She reports that she does not currently use alcohol. She reports current drug use. Drugs: Fentanyl, Marijuana, and Other.    Family History  No family history on file.     Allergies  Patient has no known allergies.    Review of Systems   Constitutional:  Positive for chills. Negative for diaphoresis and fever.   HENT:  Negative for congestion and rhinorrhea.    Respiratory:  Negative for cough, chest tightness and shortness of breath.    Cardiovascular:  Negative for chest pain, palpitations and leg swelling.        Patient tells me she went to a cardiologist about 8 months ago who told her she had a heart murmur.    Gastrointestinal:  Positive for diarrhea (Nonbloody). Negative for abdominal pain, nausea and vomiting.   Genitourinary:  Negative for difficulty urinating, dysuria, hematuria and urgency.   Musculoskeletal:  Positive for arthralgias (R hip pain, chronic).   Skin:  Negative for color change.   Neurological:  Positive for headaches (L sided headache). Negative for dizziness.   Psychiatric/Behavioral:  Negative for agitation and suicidal ideas.         Physical Exam  Constitutional:       General: She is not in acute distress.     Appearance: Normal appearance. She is not ill-appearing.   HENT:      Head: Normocephalic and atraumatic.      Nose: Nose normal.      Mouth/Throat:      Mouth: Mucous membranes are dry.      Pharynx: Oropharyngeal exudate present.   Eyes:      Extraocular Movements: Extraocular movements intact.      Conjunctiva/sclera: Conjunctivae normal.      Pupils: Pupils are equal, round, and reactive to light.   Cardiovascular:      Rate and Rhythm: Normal rate and regular rhythm.      Pulses: Normal pulses.      Heart sounds: Normal heart sounds.   Pulmonary:      Effort: Pulmonary effort is normal. No respiratory distress.      Breath sounds: Normal breath sounds. No wheezing.   Abdominal:      General: Abdomen is flat. Bowel sounds are normal.       "Palpations: Abdomen is soft.      Tenderness: There is abdominal tenderness (Mild tenderness in epigastric region).   Musculoskeletal:         General: No swelling.      Cervical back: Normal range of motion.      Right lower leg: No edema.      Left lower leg: No edema.   Skin:     General: Skin is warm and dry.      Capillary Refill: Capillary refill takes less than 2 seconds.   Neurological:      General: No focal deficit present.      Mental Status: She is alert and oriented to person, place, and time.   Psychiatric:         Mood and Affect: Mood normal.         Behavior: Behavior normal.         Thought Content: Thought content normal.          Last Recorded Vitals  Blood pressure 115/75, pulse 82, temperature 35.4 °C (95.7 °F), resp. rate 18, height 1.702 m (5' 7\"), weight 72.6 kg (160 lb), SpO2 99%.    Relevant Results    Results for orders placed or performed during the hospital encounter of 05/18/24 (from the past 24 hour(s))   CBC and Auto Differential   Result Value Ref Range    WBC 9.7 4.4 - 11.3 x10*3/uL    nRBC 0.0 0.0 - 0.0 /100 WBCs    RBC 4.32 4.00 - 5.20 x10*6/uL    Hemoglobin 14.1 12.0 - 16.0 g/dL    Hematocrit 42.6 36.0 - 46.0 %    MCV 99 80 - 100 fL    MCH 32.6 26.0 - 34.0 pg    MCHC 33.1 32.0 - 36.0 g/dL    RDW 14.6 (H) 11.5 - 14.5 %    Platelets 288 150 - 450 x10*3/uL    Neutrophils % 75.2 40.0 - 80.0 %    Immature Granulocytes %, Automated 4.3 (H) 0.0 - 0.9 %    Lymphocytes % 17.7 13.0 - 44.0 %    Monocytes % 2.0 2.0 - 10.0 %    Eosinophils % 0.2 0.0 - 6.0 %    Basophils % 0.6 0.0 - 2.0 %    Neutrophils Absolute 7.28 1.20 - 7.70 x10*3/uL    Immature Granulocytes Absolute, Automated 0.42 0.00 - 0.70 x10*3/uL    Lymphocytes Absolute 1.71 1.20 - 4.80 x10*3/uL    Monocytes Absolute 0.19 0.10 - 1.00 x10*3/uL    Eosinophils Absolute 0.02 0.00 - 0.70 x10*3/uL    Basophils Absolute 0.06 0.00 - 0.10 x10*3/uL   Magnesium   Result Value Ref Range    Magnesium 2.51 (H) 1.60 - 2.40 mg/dL   Comprehensive " metabolic panel   Result Value Ref Range    Glucose 412 (H) 74 - 99 mg/dL    Sodium 137 136 - 145 mmol/L    Potassium 5.4 (H) 3.5 - 5.3 mmol/L    Chloride 105 98 - 107 mmol/L    Bicarbonate 17 (L) 21 - 32 mmol/L    Anion Gap 20 10 - 20 mmol/L    Urea Nitrogen 14 6 - 23 mg/dL    Creatinine 1.93 (H) 0.50 - 1.05 mg/dL    eGFR 29 (L) >60 mL/min/1.73m*2    Calcium 9.1 8.6 - 10.3 mg/dL    Albumin 4.5 3.4 - 5.0 g/dL    Alkaline Phosphatase 71 33 - 136 U/L    Total Protein 6.9 6.4 - 8.2 g/dL    AST 55 (H) 9 - 39 U/L    Bilirubin, Total 0.4 0.0 - 1.2 mg/dL    ALT 61 (H) 7 - 45 U/L   Blood Gas Venous Full Panel   Result Value Ref Range    POCT pH, Venous 7.07 (LL) 7.33 - 7.43 pH    POCT pCO2, Venous 66 (H) 41 - 51 mm Hg    POCT pO2, Venous 23 (L) 35 - 45 mm Hg    POCT SO2, Venous 31 (L) 45 - 75 %    POCT Oxy Hemoglobin, Venous 29.2 (L) 45.0 - 75.0 %    POCT Hematocrit Calculated, Venous 43.0 36.0 - 46.0 %    POCT Sodium, Venous 137 136 - 145 mmol/L    POCT Potassium, Venous 5.5 (H) 3.5 - 5.3 mmol/L    POCT Chloride, Venous 100 98 - 107 mmol/L    POCT Ionized Calicum, Venous 1.25 1.10 - 1.33 mmol/L    POCT Glucose, Venous 372 (H) 74 - 99 mg/dL    POCT Lactate, Venous 6.8 (HH) 0.4 - 2.0 mmol/L    POCT Base Excess, Venous -12.0 (L) -2.0 - 3.0 mmol/L    POCT HCO3 Calculated, Venous 19.1 (L) 22.0 - 26.0 mmol/L    POCT Hemoglobin, Venous 14.4 12.0 - 16.0 g/dL    POCT Anion Gap, Venous 23.0 10.0 - 25.0 mmol/L    Patient Temperature 37.0 degrees Celsius    FiO2 21 %    Critical Called By YOUSUF HALEY RRT     Critical Called To DR BRIGGS     Critical Call Time 455     Critical Read Back Y    Troponin I, High Sensitivity, Initial   Result Value Ref Range    Troponin I, High Sensitivity 15 (H) 0 - 13 ng/L   Lavender Top   Result Value Ref Range    Extra Tube Hold for add-ons.    Troponin, High Sensitivity, 1 Hour   Result Value Ref Range    Troponin I, High Sensitivity 35 (H) 0 - 13 ng/L   Lactate   Result Value Ref Range    Lactate  3.0 (H) 0.4 - 2.0 mmol/L      XR chest 1 view    Result Date: 5/18/2024  Interpreted By:  Oliver Bowman, STUDY: XR CHEST 1 VIEW;  5/18/2024 5:24 am   INDICATION: Signs/Symptoms:overdose.   COMPARISON: 05/03/2024   ACCESSION NUMBER(S): [id number]   ORDERING CLINICIAN: BJ BRIGGS   FINDINGS:     The cardiomediastinal silhouette and pulmonary vasculature are within normal limits. No consolidation, pleural effusion or pneumothorax.         No acute cardiopulmonary process.     MACRO: None.   Signed by: Oliver Bowman 5/18/2024 5:27 AM Dictation workstation:   [id number]    CT abdomen pelvis wo IV contrast    Result Date: 5/17/2024  EXAMINATION: CT RENAL STONE 05/17/2024 05:51 PM CLINICAL HISTORY: Stone pain ASSOCIATED DIAGNOSIS: Stone pain ORDERING PROVIDER: BRITTNEE HOANG TECHNOLOGISTS NOTE: COMPARISON: None TECHNIQUE: CT abdomen and pelvis without contrast performed for renal stone protocol. Contiguous unenhanced axial images were obtained from above the kidneys through the level of the pubic symphysis. 2D sagittal and coronal reconstructions were obtained from the axial data. FINDINGS: Included images of the lower thorax: No focal lung consolidation or pleural effusion. Hepatobiliary: Unremarkable liver without biliary dilation evident. Pancreas: Unremarkable Spleen: Unremarkable Adrenal Glands: Left adrenal calcification may be related to prior infection or hemorrhage. Kidneys, ureters, and bladder: No calculi or hydroureteronephrosis. Abdominal and pelvic vasculature: Atherosclerotic wall calcifications are present without abdominal aortic aneurysm. GI tract: No evidence of obstruction.  The appendix is not dilated with an appendicolith distally. Evaluation of the appendiceal tip is limited by proximity to the right ovary, however there is no significant periappendiceal fat stranding. Peritoneum and retroperitoneum: No free fluid or free air is noted. Pelvic phleboliths are seen. Lymph Nodes: No abdominal  or pelvic lymphadenopathy is evident. Uterus and adnexa: Not well evaluated via CT. Visualized musculoskeletal structures: Degenerative changes are present within the hips and lumbar spine. Calcified subcutaneous granuloma is present within the right flank. IMPRESSION: 1.  No renal stone or hydronephrosis identified. 2.  Nondilated appendix with an appendicolith distally. 3.  Please see full report for additional findings/details. MACRO: None    CT lumbar spine wo IV contrast    Result Date: 5/17/2024  EXAMINATION: CT L-SPINE W/O CONTRAST 05/17/2024 05:51 PM CLINICAL HISTORY: low back pain ASSOCIATED DIAGNOSIS: low back pain ORDERING PROVIDER: BRITTNEE HOANG TECHNOLOGISTS NOTE: COMPARISON: None TECHNIQUE: Thin axial images were obtained through the lumbar region without intravenous contrast. 2D sagittal and coronal reconstructions were obtained from the axial data. FINDINGS: Vertebrae: Mild S-shaped scoliosis. No acute fracture or traumatic malalignment. Remote deformities of the bilateral L3 and right L4 transverse process. No aggressive osseous lesions. Moderate to severe multilevel facet arthropathy with mild degenerative changes in the disks with vacuum disc phenomenon at L4-5. Small posterior disc osteophyte complex at L3-4 and L4-5 and L5-S1 without CT evidence of critical canal narrowing. There is a prominent spurring and disc in the left L5-S1 foramen resulting in estimated at moderate narrowing. Mild to moderate narrowing of the right L2-3 and L3-4 foramen. Visible sacrum and pelvis: No acute abnormality. Please see separately dictated renal stone CT for intra-abdominal/intrapelvic findings. IMPRESSION: 1.  No fracture or malalignment of the lower thoracic or lumbar spine. 2.  Spondylosis as above. MACRO: None          Assessment/Plan   Principal Problem:    Accidental drug ingestion, initial encounter    Opiate overdose  Opiate use disorder  QT Prolongation  Elevated troponins  Acute kidney injury  Left sided  headache, s/p fall  Diarrhea  -Social work and TCC consult and recommendations appreciated  -Urinalysis and drug screen pending  -CT head w/o IV contrast ordered  -IV fluids  -COWS protocol activated  -Continue home suboxone  -OARRS reviewed  -Troponin 15, repeat 35, third troponin ordered. Likely due to demand ischemia  -Echo ordered  -Repeat EKG tomorrow morning  -HgbA1c from 4/20/24 is 5.4%, glucose of 412 today is likely reactive, will order SSI if necessary  -C diff PCR, stool PCR, O&P ordered for diarrhea   -I's & O's , stool counts  -Consider CT abdomen/pelvis given recent hx of enteritis pending clinical course  -CBC, CMP in the AM  -[facility]rive consulted    Nicotine use disorder  -Pt currently smoking 1ppd  -Nicotine patch ordered    Rheumatoid arthritis  -Hold home celebrex due to elevated Cr    Generalized anxiety disorder  Bipolar 2 Disorder  -Patient Qtc today is 500  -Continue home Bupropion  -Hold Doxepin and Hydroxyzine     DVT Prophylaxis  -Heparin, SCDs     FULLY EVALUATED AND PLAN  as ABOVE,    I spent 50 minutes in the professional and overall care of this patient.      ALETA ConradC

## 2024-05-18 NOTE — CONSULTS
Inpatient consult to Cardiology  Consult performed by: James C Ramicone, DO  Consult ordered by: Deneen Chavarria PA-C  Reason for consult: Elevated troponin      History Of Present Illness:      This is a 59-year-old female with a history of substance abuse.  She reports having chronic pain involving her back and right flank area.  She denies having any exertional chest pain or shortness of breath, and denies any history of myocardial infarction or congestive heart failure.  The patient was brought to [facility] [place] because of a suspected drug overdose, and the patient received intravenous Narcan in the field by EMS.  Currently the patient is sitting up in a chair and feeling well with no cardiac complaints.    Past medical history:    Bipolar disorder  History of substance abuse  Rheumatoid arthritis    Past surgical history: Knee replacement    Social history: Positive for tobacco use and history of substance abuse    Family history: Positive for coronary artery disease    Review of Systems    Other review of systems negative    Social History:  She reports that she has been smoking cigarettes. She started smoking about 49 years ago. She has a 24.6 pack-year smoking history. She has never used smokeless tobacco. She reports that she does not currently use alcohol. She reports current drug use. Drugs: Fentanyl, Marijuana, and Other.    Family History:  No family history on file.     Allergies:  Patient has no known allergies.    Medications:  Current Facility-Administered Medications   Medication Dose Route Frequency Provider Last Rate Last Admin    acetaminophen (Tylenol) tablet 650 mg  650 mg oral q4h PRN Deneen Chavarria PA-C   650 mg at 05/18/24 1122    buprenorphine (Subtex) SL tablet 8 mg  8 mg sublingual BID Deneen Chavarria PA-C   8 mg at 05/18/24 1121    buPROPion XL (Wellbutrin XL) 24 hr tablet 300 mg  300 mg oral Daily Deneen Chavarria PA-C   300 mg at 05/18/24 1121    cloNIDine (Catapres) tablet 0.1 mg   0.1 mg oral q4h PRN Deneen F Null, PA-C        dicyclomine (Bentyl) capsule 10 mg  10 mg oral TID Deneen F Aura, PA-C        DULoxetine (Cymbalta) DR capsule 30 mg  30 mg oral Daily Juan Francisco Chacon MD        heparin (porcine) injection 5,000 Units  5,000 Units subcutaneous q8h Deneen F Aura, PA-C   5,000 Units at 05/18/24 1122    hydrOXYzine pamoate (Vistaril) capsule 25 mg  25 mg oral q6h PRN Juan Francisco Chacon MD        ketorolac (Toradol) injection 15 mg  15 mg intravenous q8h PRN Juan Francisco Chacon MD        lidocaine 4 % patch 1 patch  1 patch transdermal Daily Deneen F Aura, PA-C   1 patch at 05/18/24 1122    nicotine (Nicoderm CQ) 21 mg/24 hr patch 1 patch  1 patch transdermal Daily Deneen F Aura, PA-C   1 patch at 05/18/24 1122    [START ON 5/19/2024] pantoprazole (ProtoNix) EC tablet 40 mg  40 mg oral Daily before breakfast Deneen F Null, PA-C        perflutren lipid microspheres (Definity) injection 0.5-10 mL of dilution  0.5-10 mL of dilution intravenous Once in imaging Deneen F Null, PA-C        perflutren protein A microsphere (Optison) injection 0.5 mL  0.5 mL intravenous Once in imaging Deneen F Null, PA-C        sodium chloride 0.9% infusion  75 mL/hr intravenous Continuous Deneen F Aura, PA-C 75 mL/hr at 05/18/24 1121 75 mL/hr at 05/18/24 1121    sulfur hexafluoride microsphr (Lumason) injection 24.28 mg  2 mL intravenous Once in imaging Deneen F Null, PA-C        trimethobenzamide (Tigan) injection 200 mg  200 mg intramuscular q8h PRN Deneen F Aura, PA-C             Last Recorded Vitals:  Vitals:    05/18/24 0919 05/18/24 1016 05/18/24 1100 05/18/24 1422   BP:  110/63  90/55   BP Location:  Right arm     Patient Position:  Sitting     Pulse: 82 76 76 64   Resp:  18     Temp:  36.7 °C (98.1 °F)  36.6 °C (97.9 °F)   TempSrc:  Temporal     SpO2:  95%  95%   Weight:       Height:           Physical Exam:    General Appearance:  Alert, oriented, no distress  Skin:  Warm and dry  Head and Neck:  No elevation of JVP,  "no carotid bruits  Cardiac Exam:  Rhythm is regular, S1 and S2 are normal, no murmur S3 or S4  Lungs:  Clear to auscultation  Extremities:  no edema  Neurologic:  No focal deficits  Psychiatric:  Appropriate mood and behavior         Last Labs:  CBC -  Lab Results   Component Value Date    WBC 9.7 05/18/2024    HGB 14.1 05/18/2024    HCT 42.6 05/18/2024    MCV 99 05/18/2024     05/18/2024       CMP -  Lab Results   Component Value Date    CALCIUM 9.1 05/18/2024    PROT 6.9 05/18/2024    ALBUMIN 4.5 05/18/2024    AST 55 (H) 05/18/2024    ALT 61 (H) 05/18/2024    ALKPHOS 71 05/18/2024    BILITOT 0.4 05/18/2024       LIPID PANEL -   No results found for: \"CHOL\", \"TRIG\", \"HDL\", \"CHHDL\", \"LDLF\", \"VLDL\", \"NHDL\"    RENAL FUNCTION PANEL -   Lab Results   Component Value Date    GLUCOSE 412 (H) 05/18/2024     05/18/2024    K 5.4 (H) 05/18/2024     05/18/2024    CO2 17 (L) 05/18/2024    ANIONGAP 20 05/18/2024    BUN 14 05/18/2024    CREATININE 1.93 (H) 05/18/2024    CALCIUM 9.1 05/18/2024    ALBUMIN 4.5 05/18/2024        No results found for: \"BNP\", \"HGBA1C\"          No results found for this or any previous visit.    Test Review:  I have personally review the diagnostic testing:  EKG: Normal sinus rhythm, no ischemic changes    Assessment/Plan:    1.  Elevated troponin: This patient was found to have an incidental elevation of troponin after a suspected drug overdose.  The patient reports no prior history of myocardial infarction and has been asymptomatic from a cardiac perspective.  EKG does not show any acute ischemic changes.  I do not feel that the troponin elevation is clinically relevant in this situation and an ischemic workup is not recommended at this time.  The patient is cleared for discharge from a cardiac perspective.    James C Ramicone, DO    "

## 2024-05-18 NOTE — ED TRIAGE NOTES
Pt presents to ED with c/o overdose per EMS. Pt was given 4mg of narcan, a&ox2 at the time of arrival. Pt denies taking anything.

## 2024-05-18 NOTE — ED PROVIDER NOTES
HPI   Chief Complaint   Patient presents with    Drug Overdose       HPI     Patient is a 59-year-old female present to the emergency department today in the setting of suspected drug overdose.  She does have a notable history of pain management issues, substance use disorder with recent hospitalization in the setting of an overdose.  She was discharged back in early May.  Patient's sister were spoke with over the phone provide some collateral that patient's been struggling with her hip pain was actually seen at [facility] today for her hip as she has severe osteoarthritis.  She was disappointed that she did not receive any opiates per patient sister.  She then drove off and returned home and was in the garage when she may have overdosed.  Her sister went to check on her after her daughter mom called out saying that she was not responding.  She was breathing at the time though unresponsive to shaking.  She seemed a little bit pale.  They called 911 and on EMS arrival she received Narcan which made her alert and aware.  Patient denies any pain at this time she states she does feel chills and cold.  She has pain in her hip which is not new.               No data recorded                   Patient History   Past Medical History:   Diagnosis Date    Arthritis      Past Surgical History:   Procedure Laterality Date    FRACTURE SURGERY Left     leg    TOTAL KNEE ARTHROPLASTY Right      No family history on file.  Social History     Tobacco Use    Smoking status: Every Day     Current packs/day: 0.50     Average packs/day: 0.5 packs/day for 49.2 years (24.6 ttl pk-yrs)     Types: Cigarettes     Start date: 3/1/1975    Smokeless tobacco: Never   Vaping Use    Vaping status: Never Used   Substance Use Topics    Alcohol use: Not Currently    Drug use: Yes     Types: Fentanyl, Marijuana, Other     Comment: opiates       Physical Exam   ED Triage Vitals [05/18/24 0442]   Temperature Heart Rate Respirations BP   35.4 °C (95.7 °F) 91  20 162/86      SpO2 Temp src Heart Rate Source Patient Position   -- -- -- --      BP Location FiO2 (%)     -- --       Physical Exam  Vitals and nursing note reviewed.   Constitutional:       General: She is not in acute distress.     Appearance: She is well-developed.   HENT:      Head: Normocephalic and atraumatic.   Eyes:      Conjunctiva/sclera: Conjunctivae normal.   Cardiovascular:      Rate and Rhythm: Normal rate and regular rhythm.      Heart sounds: No murmur heard.  Pulmonary:      Effort: Pulmonary effort is normal. No respiratory distress.      Breath sounds: Normal breath sounds.   Abdominal:      Palpations: Abdomen is soft.      Tenderness: There is no abdominal tenderness.   Musculoskeletal:         General: No swelling.      Cervical back: Neck supple.   Skin:     General: Skin is warm and dry.      Capillary Refill: Capillary refill takes less than 2 seconds.   Neurological:      General: No focal deficit present.      Mental Status: She is alert and oriented to person, place, and time.   Psychiatric:         Mood and Affect: Mood normal.         ED Course & MDM   ED Course as of 05/18/24 0712   Sat May 18, 2024   0451 EKG as interpreted myself: Rate 87, , , QTc 500.  Normal sinus rhythm.  No ischemia. [AH]      ED Course User Index  [AH] Tasneem Cristobal MD         Diagnoses as of 05/18/24 0712   Accidental drug overdose, initial encounter       Medical Decision Making  Patient is 59-year-old female present to the emergency department as above on arrival here hemodynamically stable.  Suspected drug overdose.  Will monitor closely.  Workup pursued notably patient is hyperglycemic spoke with daughter on the phone he states patient does have a sister on the phone he states they do a family history of diabetes.  Will evaluate for DKA the lower suspicion given symptoms began after suspected ingestion of intoxicants.  Will monitor closely.  Patient would benefit from substance use  treatment.    Patient's workup reviewed notable for significant acidosis, troponin elevation.  Patient's EKG is normal sinus rhythm.  Mildly prolonged QT.  Her magnesium is normal.  Patient's labs otherwise reviewed reassuring.  Patient's lab delta troponin does uptrend however and given patient's presentation, risk factors discussed admission for chest pain evaluation, overdose and addiction services and treatment.  Patient Dors agreement with plan and agrees with admission.    Procedure  Procedures     Tasneem Cristobal MD  05/18/24 0712

## 2024-05-19 ENCOUNTER — APPOINTMENT (OUTPATIENT)
Dept: RADIOLOGY | Facility: HOSPITAL | Age: 59
End: 2024-05-19
Payer: COMMERCIAL

## 2024-05-19 VITALS
HEART RATE: 71 BPM | OXYGEN SATURATION: 96 % | TEMPERATURE: 98.2 F | WEIGHT: 160 LBS | BODY MASS INDEX: 25.11 KG/M2 | RESPIRATION RATE: 18 BRPM | SYSTOLIC BLOOD PRESSURE: 121 MMHG | HEIGHT: 67 IN | DIASTOLIC BLOOD PRESSURE: 76 MMHG

## 2024-05-19 LAB
ALBUMIN SERPL BCP-MCNC: 3.4 G/DL (ref 3.4–5)
ALP SERPL-CCNC: 56 U/L (ref 33–136)
ALT SERPL W P-5'-P-CCNC: 49 U/L (ref 7–45)
ANION GAP SERPL CALC-SCNC: 11 MMOL/L (ref 10–20)
APPEARANCE UR: CLEAR
AST SERPL W P-5'-P-CCNC: 41 U/L (ref 9–39)
BILIRUB SERPL-MCNC: 0.4 MG/DL (ref 0–1.2)
BILIRUB UR STRIP.AUTO-MCNC: NEGATIVE MG/DL
BUN SERPL-MCNC: 13 MG/DL (ref 6–23)
CALCIUM SERPL-MCNC: 8.4 MG/DL (ref 8.6–10.3)
CHLORIDE SERPL-SCNC: 107 MMOL/L (ref 98–107)
CO2 SERPL-SCNC: 23 MMOL/L (ref 21–32)
COLOR UR: YELLOW
CREAT SERPL-MCNC: 0.87 MG/DL (ref 0.5–1.05)
EGFRCR SERPLBLD CKD-EPI 2021: 76 ML/MIN/1.73M*2
ERYTHROCYTE [DISTWIDTH] IN BLOOD BY AUTOMATED COUNT: 14.9 % (ref 11.5–14.5)
GLUCOSE SERPL-MCNC: 95 MG/DL (ref 74–99)
GLUCOSE UR STRIP.AUTO-MCNC: NORMAL MG/DL
HCT VFR BLD AUTO: 31.2 % (ref 36–46)
HGB BLD-MCNC: 10.5 G/DL (ref 12–16)
HOLD SPECIMEN: NORMAL
KETONES UR STRIP.AUTO-MCNC: NEGATIVE MG/DL
LEUKOCYTE ESTERASE UR QL STRIP.AUTO: NEGATIVE
MCH RBC QN AUTO: 31.7 PG (ref 26–34)
MCHC RBC AUTO-ENTMCNC: 33.7 G/DL (ref 32–36)
MCV RBC AUTO: 94 FL (ref 80–100)
NITRITE UR QL STRIP.AUTO: NEGATIVE
NRBC BLD-RTO: 0 /100 WBCS (ref 0–0)
PH UR STRIP.AUTO: 5.5 [PH]
PLATELET # BLD AUTO: 211 X10*3/UL (ref 150–450)
POTASSIUM SERPL-SCNC: 3.5 MMOL/L (ref 3.5–5.3)
PROT SERPL-MCNC: 4.9 G/DL (ref 6.4–8.2)
PROT UR STRIP.AUTO-MCNC: NEGATIVE MG/DL
RBC # BLD AUTO: 3.31 X10*6/UL (ref 4–5.2)
RBC # UR STRIP.AUTO: NEGATIVE /UL
SODIUM SERPL-SCNC: 137 MMOL/L (ref 136–145)
SP GR UR STRIP.AUTO: 1.02
UROBILINOGEN UR STRIP.AUTO-MCNC: NORMAL MG/DL
WBC # BLD AUTO: 5.1 X10*3/UL (ref 4.4–11.3)

## 2024-05-19 PROCEDURE — 36415 COLL VENOUS BLD VENIPUNCTURE: CPT

## 2024-05-19 PROCEDURE — 70450 CT HEAD/BRAIN W/O DYE: CPT | Performed by: RADIOLOGY

## 2024-05-19 PROCEDURE — 70450 CT HEAD/BRAIN W/O DYE: CPT

## 2024-05-19 PROCEDURE — S4991 NICOTINE PATCH NONLEGEND: HCPCS

## 2024-05-19 PROCEDURE — 2500000005 HC RX 250 GENERAL PHARMACY W/O HCPCS

## 2024-05-19 PROCEDURE — 2500000004 HC RX 250 GENERAL PHARMACY W/ HCPCS (ALT 636 FOR OP/ED): Performed by: INTERNAL MEDICINE

## 2024-05-19 PROCEDURE — 84075 ASSAY ALKALINE PHOSPHATASE: CPT

## 2024-05-19 PROCEDURE — 96372 THER/PROPH/DIAG INJ SC/IM: CPT

## 2024-05-19 PROCEDURE — 2500000002 HC RX 250 W HCPCS SELF ADMINISTERED DRUGS (ALT 637 FOR MEDICARE OP, ALT 636 FOR OP/ED)

## 2024-05-19 PROCEDURE — 2500000001 HC RX 250 WO HCPCS SELF ADMINISTERED DRUGS (ALT 637 FOR MEDICARE OP)

## 2024-05-19 PROCEDURE — 81003 URINALYSIS AUTO W/O SCOPE: CPT | Performed by: STUDENT IN AN ORGANIZED HEALTH CARE EDUCATION/TRAINING PROGRAM

## 2024-05-19 PROCEDURE — 2500000004 HC RX 250 GENERAL PHARMACY W/ HCPCS (ALT 636 FOR OP/ED)

## 2024-05-19 PROCEDURE — 96376 TX/PRO/DX INJ SAME DRUG ADON: CPT

## 2024-05-19 PROCEDURE — 2500000001 HC RX 250 WO HCPCS SELF ADMINISTERED DRUGS (ALT 637 FOR MEDICARE OP): Performed by: INTERNAL MEDICINE

## 2024-05-19 PROCEDURE — G0378 HOSPITAL OBSERVATION PER HR: HCPCS

## 2024-05-19 PROCEDURE — 85027 COMPLETE CBC AUTOMATED: CPT

## 2024-05-19 RX ORDER — HYDROXYZINE PAMOATE 25 MG/1
25 CAPSULE ORAL EVERY 6 HOURS PRN
Qty: 30 CAPSULE | Refills: 0 | Status: SHIPPED | OUTPATIENT
Start: 2024-05-19

## 2024-05-19 RX ORDER — DULOXETIN HYDROCHLORIDE 30 MG/1
30 CAPSULE, DELAYED RELEASE ORAL DAILY
Qty: 30 CAPSULE | Refills: 0 | Status: SHIPPED | OUTPATIENT
Start: 2024-05-20 | End: 2024-06-19

## 2024-05-19 RX ADMIN — DULOXETINE HYDROCHLORIDE 30 MG: 30 CAPSULE, DELAYED RELEASE ORAL at 07:55

## 2024-05-19 RX ADMIN — KETOROLAC TROMETHAMINE 15 MG: 30 INJECTION, SOLUTION INTRAMUSCULAR at 07:51

## 2024-05-19 RX ADMIN — LIDOCAINE 1 PATCH: 4 PATCH TOPICAL at 07:55

## 2024-05-19 RX ADMIN — NICOTINE 1 PATCH: 21 PATCH, EXTENDED RELEASE TRANSDERMAL at 09:00

## 2024-05-19 RX ADMIN — BUPROPION HYDROCHLORIDE 300 MG: 150 TABLET, FILM COATED, EXTENDED RELEASE ORAL at 07:55

## 2024-05-19 RX ADMIN — DICYCLOMINE HYDROCHLORIDE 10 MG: 10 CAPSULE ORAL at 07:55

## 2024-05-19 RX ADMIN — HEPARIN SODIUM 5000 UNITS: 5000 INJECTION INTRAVENOUS; SUBCUTANEOUS at 03:13

## 2024-05-19 RX ADMIN — PANTOPRAZOLE SODIUM 40 MG: 40 TABLET, DELAYED RELEASE ORAL at 06:05

## 2024-05-19 ASSESSMENT — PAIN - FUNCTIONAL ASSESSMENT
PAIN_FUNCTIONAL_ASSESSMENT: 0-10

## 2024-05-19 ASSESSMENT — COGNITIVE AND FUNCTIONAL STATUS - GENERAL
TOILETING: A LITTLE
WALKING IN HOSPITAL ROOM: A LITTLE
DRESSING REGULAR UPPER BODY CLOTHING: A LITTLE
STANDING UP FROM CHAIR USING ARMS: A LITTLE
HELP NEEDED FOR BATHING: A LITTLE
DRESSING REGULAR LOWER BODY CLOTHING: A LITTLE
TURNING FROM BACK TO SIDE WHILE IN FLAT BAD: A LITTLE
DAILY ACTIVITIY SCORE: 19
MOBILITY SCORE: 18
CLIMB 3 TO 5 STEPS WITH RAILING: A LITTLE
PERSONAL GROOMING: A LITTLE
MOVING FROM LYING ON BACK TO SITTING ON SIDE OF FLAT BED WITH BEDRAILS: A LITTLE
MOVING TO AND FROM BED TO CHAIR: A LITTLE

## 2024-05-19 ASSESSMENT — PAIN SCALES - GENERAL
PAINLEVEL_OUTOF10: 0 - NO PAIN
PAINLEVEL_OUTOF10: 7
PAINLEVEL_OUTOF10: 7

## 2024-05-19 ASSESSMENT — PAIN DESCRIPTION - LOCATION: LOCATION: HIP

## 2024-05-19 ASSESSMENT — PAIN DESCRIPTION - ORIENTATION: ORIENTATION: RIGHT

## 2024-05-19 NOTE — CARE PLAN
The patient's goals for the shift include pain management     The clinical goals for the shift include patient will have decreased pain during shift.

## 2024-05-19 NOTE — DISCHARGE SUMMARY
Discharge Diagnosis  Accidental drug ingestion, initial encounter    Issues Requiring Follow-Up  Patient fully evaluated on May 19.  Back at baseline.  Still having significant anxiety and will be  discharged with the addition of Cymbalta and Vistaril as needed.  Recommendation for further outpatient drug rehab and detox recommended.  Social work consult was obtained for discharge.    Discharge Meds     Your medication list        START taking these medications        Instructions Last Dose Given Next Dose Due   DULoxetine 30 mg DR capsule  Commonly known as: Cymbalta  Start taking on: May 20, 2024      Take 1 capsule (30 mg) by mouth once daily. Do not crush or chew.       hydrOXYzine pamoate 25 mg capsule  Commonly known as: Vistaril      Take 1 capsule (25 mg) by mouth every 6 hours if needed for anxiety.              CONTINUE taking these medications        Instructions Last Dose Given Next Dose Due   amLODIPine 2.5 mg tablet  Commonly known as: Norvasc           buprenorphine-naloxone 8-2 mg SL tablet  Commonly known as: Suboxone           buPROPion  mg 24 hr tablet  Commonly known as: Wellbutrin XL           celecoxib 200 mg capsule  Commonly known as: CeleBREX           dicyclomine 10 mg capsule  Commonly known as: Bentyl      Take 1 capsule (10 mg) by mouth 3 times a day.       doxepin 25 mg capsule  Commonly known as: SINEquan           hydrOXYzine HCL 25 mg tablet  Commonly known as: Atarax           omeprazole 40 mg DR capsule  Commonly known as: PriLOSEC                  STOP taking these medications      amoxicillin-pot clavulanate 875-125 mg tablet  Commonly known as: Augmentin                  Where to Get Your Medications        These medications were sent to [facility]/pharmacy #[id number] - [place], [place] - [address] [facility] AT [address] [address]      Hours: 24-hours Phone: [phone]   DULoxetine 30 mg DR capsule  hydrOXYzine pamoate 25 mg capsule          Test Results Pending At Discharge  Pending Labs       Order Current Status    Cryptosporidium Antigen, Stool Collected (05/19/24 0800)    Giardia Antigen Collected (05/19/24 0800)    Ova and Parasite Examination Collected (05/19/24 0800)    Blood Gas Lactic Acid, Venous In process    Extra Urine Gray Tube In process    Urinalysis with Reflex Culture and Microscopic In process            Hospital Course         Juan Francisco Chacon MD  Physician  Internal Medicine     H&P      Addendum     Date of Service: 5/18/2024  9:42 AM     Addendum       Expand All Collapse All    History Of Present Illness  Trina Santos is a 59 y.o. female with a PMHx of opiate use disorder, nicotine use disorder, bipolar 2 disorder, rheumatoid arthritis, longstanding atraumatic right hip pain s/p R hip CSI on 4/23, and TIFFANY who presents to the ED in the setting of suspected drug overdose. Patient was recently admitted to the hospital on 5/4/24 for drug overdose, where she admitted she has been abusing opiates since she was 28 years old due to chronic pain. Toxicology screen was positive for fentanyl and cannabis. Previous to this admission on 5/4, patient states she had been clean for 82 days after completing a 7-day detox at [facility]. Patient was also treated for SHAKIRA, elevated CK, enteritis, and hypoglycemia while she was hospitalized. She was observed overnight and subsequently discharged on 5/5 in stable condition. Patient then presented to [facility] ED yesterday with complaint of hip pain, and was treated witih Flexeril, Toradol, and lidocaine patches and discharged home same day. According to ED provider note, patient's sister states patient was upset that she did not receive any opiate medications for her pain. The patient then drove home and may have overdosed in the garage. Her sister went to check on her after mother called out saying that the patient was unresponsive. 911 was called and EMS administered Narcan on arrival which  "made patient alert and aware.  Upon my arrival to the ED, I did discuss this with patient and she states she \"does not remember any of it.\"  She states that she did go to the ED yesterday, and then last night was still experiencing pain, so she took Tylenol, Celebrex and after that did not help her pain, she called her friend who gave her 1 Vicodin. She states she is still experiencing pain in her right hip.  It is worse with walking.  She tells me the steroid shot she got on 4/23 only relieved her pain for about 1 week.  X-ray of right hip and pelvis from 4/30/2024 shows end-stage right hip osteoarthritis without significant deformity.  Patient states that her orthopedic doctor told her she is not able to get a hip replacement for another 2 to 3 months.  Patient also complains of persistent nonbloody diarrhea for the past 2 weeks.  She was recently on a course of Augmentin.  She also tells me she has been experiencing a left-sided headache since her last hospital admission when she hit her head on the ground after overdosing.  She states the pain will wake her up at night. She otherwise denies fevers, chest pain, shortness of breath, vision changes, abdominal pain, nausea/vomiting, numbness/tingling, diaphoresis, tremor.  The patient tells me that she does not take her Suboxone regularly-she is supposed to take it 2 times per day, but stopped taking it within the past 2 weeks.  Patient admits to tobacco use, 1ppd, marijuana and fentanyl use, but states she is not currently using alcohol. OARRS reviewed.     ED Course: Vital signs stable, afebrile on room air. Labs and imaging performed, revealing glucose 412, potassium 5.4, creatinine 1.93 (0.98 on 5/5), ALT 61, AST 55, magnesium 2.51, initial troponin 15, repeat troponin 35, lactate 3.0, no leukocytosis, VBG shows pH 7.07, pCO2 66, pCO2 23.  CXR shows no acute cardiopulmonary process.  EKG, per ED physician, rate 87, , , QTc 500, NSR, no ischemia.  " Urinalysis and drug screen ordered and pending.  1000 mL LR bolus administered in the ED.  Patient is to be admitted observation under Dr. Chacon.     Past Medical History  Medical History        Past Medical History:   Diagnosis Date    Anxiety      Arthritis      Nicotine use disorder      Opiate use              Surgical History  Surgical History         Past Surgical History:   Procedure Laterality Date    FRACTURE SURGERY Left       leg    TOTAL KNEE ARTHROPLASTY Right              Social History  She reports that she has been smoking cigarettes. She started smoking about 49 years ago. She has a 24.6 pack-year smoking history. She has never used smokeless tobacco. She reports that she does not currently use alcohol. She reports current drug use. Drugs: Fentanyl, Marijuana, and Other.     Family History  Family History   No family history on file.        Allergies  Patient has no known allergies.     Review of Systems   Constitutional:  Positive for chills. Negative for diaphoresis and fever.   HENT:  Negative for congestion and rhinorrhea.    Respiratory:  Negative for cough, chest tightness and shortness of breath.    Cardiovascular:  Negative for chest pain, palpitations and leg swelling.        Patient tells me she went to a cardiologist about 8 months ago who told her she had a heart murmur.    Gastrointestinal:  Positive for diarrhea (Nonbloody). Negative for abdominal pain, nausea and vomiting.   Genitourinary:  Negative for difficulty urinating, dysuria, hematuria and urgency.   Musculoskeletal:  Positive for arthralgias (R hip pain, chronic).   Skin:  Negative for color change.   Neurological:  Positive for headaches (L sided headache). Negative for dizziness.   Psychiatric/Behavioral:  Negative for agitation and suicidal ideas.          Physical Exam  Constitutional:       General: She is not in acute distress.     Appearance: Normal appearance. She is not ill-appearing.   HENT:      Head:  "Normocephalic and atraumatic.      Nose: Nose normal.      Mouth/Throat:      Mouth: Mucous membranes are dry.      Pharynx: Oropharyngeal exudate present.   Eyes:      Extraocular Movements: Extraocular movements intact.      Conjunctiva/sclera: Conjunctivae normal.      Pupils: Pupils are equal, round, and reactive to light.   Cardiovascular:      Rate and Rhythm: Normal rate and regular rhythm.      Pulses: Normal pulses.      Heart sounds: Normal heart sounds.   Pulmonary:      Effort: Pulmonary effort is normal. No respiratory distress.      Breath sounds: Normal breath sounds. No wheezing.   Abdominal:      General: Abdomen is flat. Bowel sounds are normal.      Palpations: Abdomen is soft.      Tenderness: There is abdominal tenderness (Mild tenderness in epigastric region).   Musculoskeletal:         General: No swelling.      Cervical back: Normal range of motion.      Right lower leg: No edema.      Left lower leg: No edema.   Skin:     General: Skin is warm and dry.      Capillary Refill: Capillary refill takes less than 2 seconds.   Neurological:      General: No focal deficit present.      Mental Status: She is alert and oriented to person, place, and time.   Psychiatric:         Mood and Affect: Mood normal.         Behavior: Behavior normal.         Thought Content: Thought content normal.            Last Recorded Vitals  Blood pressure 115/75, pulse 82, temperature 35.4 °C (95.7 °F), resp. rate 18, height 1.702 m (5' 7\"), weight 72.6 kg (160 lb), SpO2 99%.     Relevant Results           Results for orders placed or performed during the hospital encounter of 05/18/24 (from the past 24 hour(s))   CBC and Auto Differential   Result Value Ref Range     WBC 9.7 4.4 - 11.3 x10*3/uL     nRBC 0.0 0.0 - 0.0 /100 WBCs     RBC 4.32 4.00 - 5.20 x10*6/uL     Hemoglobin 14.1 12.0 - 16.0 g/dL     Hematocrit 42.6 36.0 - 46.0 %     MCV 99 80 - 100 fL     MCH 32.6 26.0 - 34.0 pg     MCHC 33.1 32.0 - 36.0 g/dL     RDW " 14.6 (H) 11.5 - 14.5 %     Platelets 288 150 - 450 x10*3/uL     Neutrophils % 75.2 40.0 - 80.0 %     Immature Granulocytes %, Automated 4.3 (H) 0.0 - 0.9 %     Lymphocytes % 17.7 13.0 - 44.0 %     Monocytes % 2.0 2.0 - 10.0 %     Eosinophils % 0.2 0.0 - 6.0 %     Basophils % 0.6 0.0 - 2.0 %     Neutrophils Absolute 7.28 1.20 - 7.70 x10*3/uL     Immature Granulocytes Absolute, Automated 0.42 0.00 - 0.70 x10*3/uL     Lymphocytes Absolute 1.71 1.20 - 4.80 x10*3/uL     Monocytes Absolute 0.19 0.10 - 1.00 x10*3/uL     Eosinophils Absolute 0.02 0.00 - 0.70 x10*3/uL     Basophils Absolute 0.06 0.00 - 0.10 x10*3/uL   Magnesium   Result Value Ref Range     Magnesium 2.51 (H) 1.60 - 2.40 mg/dL   Comprehensive metabolic panel   Result Value Ref Range     Glucose 412 (H) 74 - 99 mg/dL     Sodium 137 136 - 145 mmol/L     Potassium 5.4 (H) 3.5 - 5.3 mmol/L     Chloride 105 98 - 107 mmol/L     Bicarbonate 17 (L) 21 - 32 mmol/L     Anion Gap 20 10 - 20 mmol/L     Urea Nitrogen 14 6 - 23 mg/dL     Creatinine 1.93 (H) 0.50 - 1.05 mg/dL     eGFR 29 (L) >60 mL/min/1.73m*2     Calcium 9.1 8.6 - 10.3 mg/dL     Albumin 4.5 3.4 - 5.0 g/dL     Alkaline Phosphatase 71 33 - 136 U/L     Total Protein 6.9 6.4 - 8.2 g/dL     AST 55 (H) 9 - 39 U/L     Bilirubin, Total 0.4 0.0 - 1.2 mg/dL     ALT 61 (H) 7 - 45 U/L   Blood Gas Venous Full Panel   Result Value Ref Range     POCT pH, Venous 7.07 (LL) 7.33 - 7.43 pH     POCT pCO2, Venous 66 (H) 41 - 51 mm Hg     POCT pO2, Venous 23 (L) 35 - 45 mm Hg     POCT SO2, Venous 31 (L) 45 - 75 %     POCT Oxy Hemoglobin, Venous 29.2 (L) 45.0 - 75.0 %     POCT Hematocrit Calculated, Venous 43.0 36.0 - 46.0 %     POCT Sodium, Venous 137 136 - 145 mmol/L     POCT Potassium, Venous 5.5 (H) 3.5 - 5.3 mmol/L     POCT Chloride, Venous 100 98 - 107 mmol/L     POCT Ionized Calicum, Venous 1.25 1.10 - 1.33 mmol/L     POCT Glucose, Venous 372 (H) 74 - 99 mg/dL     POCT Lactate, Venous 6.8 (HH) 0.4 - 2.0 mmol/L     POCT  Base Excess, Venous -12.0 (L) -2.0 - 3.0 mmol/L     POCT HCO3 Calculated, Venous 19.1 (L) 22.0 - 26.0 mmol/L     POCT Hemoglobin, Venous 14.4 12.0 - 16.0 g/dL     POCT Anion Gap, Venous 23.0 10.0 - 25.0 mmol/L     Patient Temperature 37.0 degrees Celsius     FiO2 21 %     Critical Called By YOUSUF HALEY RRT       Critical Called To DR BRIGGS       Critical Call Time 455       Critical Read Back Y     Troponin I, High Sensitivity, Initial   Result Value Ref Range     Troponin I, High Sensitivity 15 (H) 0 - 13 ng/L   Lavender Top   Result Value Ref Range     Extra Tube Hold for add-ons.     Troponin, High Sensitivity, 1 Hour   Result Value Ref Range     Troponin I, High Sensitivity 35 (H) 0 - 13 ng/L   Lactate   Result Value Ref Range     Lactate 3.0 (H) 0.4 - 2.0 mmol/L      XR chest 1 view     Result Date: 5/18/2024  Interpreted By:  Oliver Bowman, STUDY: XR CHEST 1 VIEW;  5/18/2024 5:24 am   INDICATION: Signs/Symptoms:overdose.   COMPARISON: 05/03/2024   ACCESSION NUMBER(S): [id number]   ORDERING CLINICIAN: BJ BRIGGS   FINDINGS:     The cardiomediastinal silhouette and pulmonary vasculature are within normal limits. No consolidation, pleural effusion or pneumothorax.          No acute cardiopulmonary process.     MACRO: None.   Signed by: Oliver Bowman 5/18/2024 5:27 AM Dictation workstation:   [id number]     CT abdomen pelvis wo IV contrast     Result Date: 5/17/2024  EXAMINATION: CT RENAL STONE 05/17/2024 05:51 PM CLINICAL HISTORY: Stone pain ASSOCIATED DIAGNOSIS: Stone pain ORDERING PROVIDER: BRITTNEE HOANG TECHNOLOGISTS NOTE: COMPARISON: None TECHNIQUE: CT abdomen and pelvis without contrast performed for renal stone protocol. Contiguous unenhanced axial images were obtained from above the kidneys through the level of the pubic symphysis. 2D sagittal and coronal reconstructions were obtained from the axial data. FINDINGS: Included images of the lower thorax: No focal lung consolidation or pleural  effusion. Hepatobiliary: Unremarkable liver without biliary dilation evident. Pancreas: Unremarkable Spleen: Unremarkable Adrenal Glands: Left adrenal calcification may be related to prior infection or hemorrhage. Kidneys, ureters, and bladder: No calculi or hydroureteronephrosis. Abdominal and pelvic vasculature: Atherosclerotic wall calcifications are present without abdominal aortic aneurysm. GI tract: No evidence of obstruction.  The appendix is not dilated with an appendicolith distally. Evaluation of the appendiceal tip is limited by proximity to the right ovary, however there is no significant periappendiceal fat stranding. Peritoneum and retroperitoneum: No free fluid or free air is noted. Pelvic phleboliths are seen. Lymph Nodes: No abdominal or pelvic lymphadenopathy is evident. Uterus and adnexa: Not well evaluated via CT. Visualized musculoskeletal structures: Degenerative changes are present within the hips and lumbar spine. Calcified subcutaneous granuloma is present within the right flank. IMPRESSION: 1.  No renal stone or hydronephrosis identified. 2.  Nondilated appendix with an appendicolith distally. 3.  Please see full report for additional findings/details. MACRO: None     CT lumbar spine wo IV contrast     Result Date: 5/17/2024  EXAMINATION: CT L-SPINE W/O CONTRAST 05/17/2024 05:51 PM CLINICAL HISTORY: low back pain ASSOCIATED DIAGNOSIS: low back pain ORDERING PROVIDER: BRITTNEE HOANG TECHNOLOGISTS NOTE: COMPARISON: None TECHNIQUE: Thin axial images were obtained through the lumbar region without intravenous contrast. 2D sagittal and coronal reconstructions were obtained from the axial data. FINDINGS: Vertebrae: Mild S-shaped scoliosis. No acute fracture or traumatic malalignment. Remote deformities of the bilateral L3 and right L4 transverse process. No aggressive osseous lesions. Moderate to severe multilevel facet arthropathy with mild degenerative changes in the disks with vacuum disc  phenomenon at L4-5. Small posterior disc osteophyte complex at L3-4 and L4-5 and L5-S1 without CT evidence of critical canal narrowing. There is a prominent spurring and disc in the left L5-S1 foramen resulting in estimated at moderate narrowing. Mild to moderate narrowing of the right L2-3 and L3-4 foramen. Visible sacrum and pelvis: No acute abnormality. Please see separately dictated renal stone CT for intra-abdominal/intrapelvic findings. IMPRESSION: 1.  No fracture or malalignment of the lower thoracic or lumbar spine. 2.  Spondylosis as above. MACRO: None             Assessment/Plan   Principal Problem:    Accidental drug ingestion, initial encounter     Opiate overdose  Opiate use disorder  QT Prolongation  Elevated troponins  Acute kidney injury  Left sided headache, s/p fall  Diarrhea  -Social work and TCC consult and recommendations appreciated  -Urinalysis and drug screen pending  -CT head w/o IV contrast ordered  -IV fluids  -COWS protocol activated  -Continue home suboxone  -OARRS reviewed  -Troponin 15, repeat 35, third troponin ordered. Likely due to demand ischemia  -Echo ordered  -Repeat EKG tomorrow morning  -HgbA1c from 4/20/24 is 5.4%, glucose of 412 today is likely reactive, will order SSI if necessary  -C diff PCR, stool PCR, O&P ordered for diarrhea   -I's & O's , stool counts  -Consider CT abdomen/pelvis given recent hx of enteritis pending clinical course  -CBC, CMP in the AM  -[facility]rive consulted     Nicotine use disorder  -Pt currently smoking 1ppd  -Nicotine patch ordered     Rheumatoid arthritis  -Hold home celebrex due to elevated Cr     Generalized anxiety disorder  Bipolar 2 Disorder  -Patient Qtc today is 500  -Continue home Bupropion  -Hold Doxepin and Hydroxyzine      DVT Prophylaxis  -Heparin, SCDs        FULLY EVALUATED AND PLAN  as ABOVE,     I spent 50 minutes in the professional and overall care of this patient.                    Revision History         Pertinent Physical Exam  At Time of Discharge  Physical Exam    Outpatient Follow-Up  No future appointments.      Juan Francisco Chacon MD

## 2024-05-19 NOTE — CARE PLAN
The patient's goals for the shift include  decreased right hip pain.    The clinical goals for the shift include patient will have decreased pain during shift.    Over the shift, the patient did not make progress toward the following goals. Barriers to progression include chronic right hip pain. Recommendations to address these barriers include pain medicine and consults.

## 2024-05-19 NOTE — PROGRESS NOTES
Rec'd referral per pt's request.  Met with pt, introducing myself, discussed SW role. Pt notes that she has been active with [facility] & does not feel that they are helping her.   Provided her with ETOH resources listing, after reviewing listing with her. Encouraged her to follow up with [facility] who would be able to assist in locating an alternate program that's covered under her insurance. Pt verbalizes understanding & agreement. Mirian Ornelas, GERARDOW

## 2024-05-22 LAB
ATRIAL RATE: 64 BPM
P AXIS: 63 DEGREES
PR INTERVAL: 174 MS
Q ONSET: 249 MS
QRS COUNT: 10 BEATS
QRS DURATION: 92 MS
QT INTERVAL: 400 MS
QTC CALCULATION(BAZETT): 413 MS
QTC FREDERICIA: 408 MS
R AXIS: 63 DEGREES
T AXIS: 31 DEGREES
T OFFSET: 449 MS
VENTRICULAR RATE: 64 BPM

## 2024-05-25 LAB
ATRIAL RATE: 87 BPM
P AXIS: 60 DEGREES
P OFFSET: 177 MS
P ONSET: 140 MS
PR INTERVAL: 130 MS
Q ONSET: 205 MS
QRS COUNT: 14 BEATS
QRS DURATION: 106 MS
QT INTERVAL: 416 MS
QTC CALCULATION(BAZETT): 500 MS
QTC FREDERICIA: 470 MS
R AXIS: 81 DEGREES
T AXIS: 48 DEGREES
T OFFSET: 413 MS
VENTRICULAR RATE: 87 BPM